# Patient Record
Sex: FEMALE | Race: WHITE | NOT HISPANIC OR LATINO | Employment: PART TIME | URBAN - METROPOLITAN AREA
[De-identification: names, ages, dates, MRNs, and addresses within clinical notes are randomized per-mention and may not be internally consistent; named-entity substitution may affect disease eponyms.]

---

## 2017-01-03 RX ORDER — ESCITALOPRAM OXALATE 10 MG/1
TABLET ORAL
Qty: 90 TABLET | Refills: 1 | Status: SHIPPED | OUTPATIENT
Start: 2017-01-03 | End: 2019-07-30

## 2017-01-05 RX ORDER — ESCITALOPRAM OXALATE 10 MG/1
TABLET ORAL
Qty: 30 TABLET | Refills: 5 | Status: SHIPPED | OUTPATIENT
Start: 2017-01-05 | End: 2017-07-13

## 2017-06-26 ENCOUNTER — PATIENT OUTREACH (OUTPATIENT)
Dept: ADMINISTRATIVE | Facility: HOSPITAL | Age: 47
End: 2017-06-26

## 2017-06-26 NOTE — LETTER
June 26, 2017    Adamaris Vanegas  [address] [facility] [address]             [facility]  [address]  [facility] [address]  Phone: [phone] Dear Mrs. Vanegas:    [facility] is committed to your overall health.  To help you get the most out of each of your visits, we will review your information to make sure you are up to date on all of your recommended tests and/or procedures.      Dr. Karissa Allen has found that you may be due for a tetanus immunization.     If you have had any of the above done at another facility, please bring the records or information with you so that your record at [facility] will be complete.  If you would like to schedule any of these, please contact me.    If you are currently taking medication, please bring it with you to your appointment for review.    If you have any questions or concerns, please don't hesitate to call.    Thank you for letting us care for you,  Dagmar Gallegos LPN Clinical Care Coordinator  [facility]  [phone]

## 2017-07-13 ENCOUNTER — OFFICE VISIT (OUTPATIENT)
Dept: FAMILY MEDICINE | Facility: CLINIC | Age: 47
End: 2017-07-13
Payer: COMMERCIAL

## 2017-07-13 VITALS
HEART RATE: 72 BPM | WEIGHT: 139.88 LBS | HEIGHT: 67 IN | BODY MASS INDEX: 21.96 KG/M2 | SYSTOLIC BLOOD PRESSURE: 102 MMHG | TEMPERATURE: 98 F | DIASTOLIC BLOOD PRESSURE: 60 MMHG

## 2017-07-13 DIAGNOSIS — F41.9 ANXIETY: Primary | ICD-10-CM

## 2017-07-13 DIAGNOSIS — Z23 IMMUNIZATION DUE: ICD-10-CM

## 2017-07-13 PROCEDURE — 90471 IMMUNIZATION ADMIN: CPT | Mod: S$GLB,,, | Performed by: FAMILY MEDICINE

## 2017-07-13 PROCEDURE — 90715 TDAP VACCINE 7 YRS/> IM: CPT | Mod: S$GLB,,, | Performed by: FAMILY MEDICINE

## 2017-07-13 PROCEDURE — 99213 OFFICE O/P EST LOW 20 MIN: CPT | Mod: S$GLB,,, | Performed by: FAMILY MEDICINE

## 2017-07-13 PROCEDURE — 99999 PR PBB SHADOW E&M-EST. PATIENT-LVL III: CPT | Mod: PBBFAC,,, | Performed by: FAMILY MEDICINE

## 2017-07-13 NOTE — PROGRESS NOTES
Subjective:       Patient ID: Adamaris Vanegas is a 47 y.o. female.    Chief Complaint: Follow-up    HPI   Patient here today for f/u.  Doing well on lexapro, no complaints with use. Sleep preserved. Has not required xanax.  Travelling to [place] next week for vacation. No vaccination records. Needs tdap at minimum. Not sure of exact location of travel, but discussed cdc recs which incl hep A, hep B, and malaria prophy. Also reviewed zika precautions.    Review of Systems   Constitutional: Negative for appetite change, fatigue and unexpected weight change.   Gastrointestinal: Negative for constipation, diarrhea and nausea.   Neurological: Negative for dizziness and headaches.   Psychiatric/Behavioral: Negative for dysphoric mood and sleep disturbance. The patient is not nervous/anxious.        Objective:      Physical Exam   Constitutional: She is oriented to person, place, and time. She appears well-developed and well-nourished. No distress.   HENT:   Head: Normocephalic and atraumatic.   Eyes: Conjunctivae are normal. Right eye exhibits no discharge. Left eye exhibits no discharge. No scleral icterus.   Neck: Normal range of motion. Neck supple.   Cardiovascular: Normal rate and regular rhythm.    Pulmonary/Chest: Effort normal and breath sounds normal. No respiratory distress.   Abdominal: Soft. She exhibits no distension.   Musculoskeletal: Normal range of motion. She exhibits no edema.   Neurological: She is alert and oriented to person, place, and time.   Skin: Skin is warm and dry. No rash noted.   Psychiatric: She has a normal mood and affect. Her behavior is normal.   Nursing note and vitals reviewed.        Anxiety  Doing well on lexapro 10mg daily. Continue at current dose and sx monitor during school year. Let me know if sx recurrence.  Immunization due    Other orders  -     (In Office Administered) Tdap Vaccine

## 2017-09-12 RX ORDER — ACYCLOVIR 800 MG/1
TABLET ORAL
Qty: 15 TABLET | Refills: 0 | Status: SHIPPED | OUTPATIENT
Start: 2017-09-12 | End: 2019-07-28 | Stop reason: SDUPTHER

## 2017-12-21 ENCOUNTER — OFFICE VISIT (OUTPATIENT)
Dept: FAMILY MEDICINE | Facility: CLINIC | Age: 47
End: 2017-12-21
Payer: COMMERCIAL

## 2017-12-21 VITALS
OXYGEN SATURATION: 98 % | BODY MASS INDEX: 22.79 KG/M2 | SYSTOLIC BLOOD PRESSURE: 102 MMHG | HEART RATE: 90 BPM | WEIGHT: 145.19 LBS | HEIGHT: 67 IN | DIASTOLIC BLOOD PRESSURE: 66 MMHG

## 2017-12-21 DIAGNOSIS — R68.89 FLU-LIKE SYMPTOMS: Primary | ICD-10-CM

## 2017-12-21 LAB
CTP QC/QA: YES
FLUAV AG NPH QL: NEGATIVE
FLUBV AG NPH QL: NEGATIVE

## 2017-12-21 PROCEDURE — 87804 INFLUENZA ASSAY W/OPTIC: CPT | Mod: QW,S$GLB,, | Performed by: FAMILY MEDICINE

## 2017-12-21 PROCEDURE — 99213 OFFICE O/P EST LOW 20 MIN: CPT | Mod: S$GLB,,, | Performed by: FAMILY MEDICINE

## 2017-12-21 PROCEDURE — 99999 PR PBB SHADOW E&M-EST. PATIENT-LVL III: CPT | Mod: PBBFAC,,, | Performed by: FAMILY MEDICINE

## 2017-12-21 NOTE — PROGRESS NOTES
Subjective:     THIS DOCUMENT WAS MADE IN PART WITH [employer] DICTATION SOFTWARE.  OCCASIONALLY THIS SOFTWARE WILL MISINTERPRET WORDS OR PHRASES.     Patient ID: Adamaris Vanegas is a 47 y.o. female.    Chief Complaint: Chills (pt states she has been having low grade fever since last night and painful coughing since Sunday.  )    HPI     Cough and congestion, temperature 101.0 this morning.  Symptoms present less than 24 hours.Temperature this morning was 101.0. Mucinex has helped in the past. She also did take ibuprofen for the fever.    Father susceptible to illness, getting out of the hospital, going into nursing home, has COPD, concerns that she may have a contagious illness.  She does not have any chronic lung disease.  No asthma no emphysema, nonsmoker.  She is a schoolteacher.     She's having some pain in the right upper chest. She thinks she may have pulled a muscle, the other day she was horsing around with her son though didn't feel any pain at the time. Just pain when she moves her arm. She does not have pain with respiration. Though she does have pain when she coughs    Past medical/family/social/medications/allergies reviewed and marked accordingly in this patient's electronic medical record, refer to the appropriate section for complete details.      Review of Systems   Constitutional: Positive for chills and fever.   HENT: Positive for congestion.    Respiratory: Positive for cough. Negative for shortness of breath and wheezing.    Cardiovascular:        As above       Objective:      Physical Exam   Constitutional: She is oriented to person, place, and time. She appears well-developed and well-nourished.   HENT:   Head: Normocephalic and atraumatic. Head is without right periorbital erythema and without left periorbital erythema.   Right Ear: Tympanic membrane, external ear and ear canal normal. No drainage.   Left Ear: Tympanic membrane, external ear and ear canal normal. No drainage.   Nose: No mucosal  edema (Boggy swollen turbinates) or rhinorrhea (Clear).   Mouth/Throat: Uvula is midline and oropharynx is clear and moist. No oropharyngeal exudate or posterior oropharyngeal erythema.   Eyes: Conjunctivae and EOM are normal. Pupils are equal, round, and reactive to light. Right eye exhibits no discharge. Left eye exhibits no discharge. No scleral icterus.   Neck: Normal range of motion. Neck supple. No tracheal deviation present. No thyromegaly present.   Cardiovascular: Normal rate, regular rhythm and normal heart sounds.  Exam reveals no gallop and no friction rub.    No murmur heard.  Pulmonary/Chest: Effort normal and breath sounds normal. No stridor. No respiratory distress. She has no wheezes. She has no rales.   Lungs are clear. No distress. Lung sounds present throughout all lung fields. No crackles.   Lymphadenopathy:     She has no cervical adenopathy.   Neurological: She is alert and oriented to person, place, and time. She has normal reflexes. No cranial nerve deficit.   Skin: Skin is warm and dry. She is not diaphoretic.   Psychiatric: She has a normal mood and affect. Her behavior is normal.   Vitals reviewed.      Assessment:       1. Flu-like symptoms        Plan:       Adamaris was seen today for chills.    Diagnoses and all orders for this visit:    Flu-like symptoms  -     POCT Influenza A/B     rapid flu is negative. Suspected viral respiratory infection. Use Mucinex, ibuprofen. She should assume that she is contagious and use precautions especially if around her father who is susceptible to illness.  But she will monitor symptoms, a fever persists or worsens, Z. If she develops copious colored sputum and reevaluate. Also the discomfort in her right upper chest appears musculoskeletal did not hear anything on exam to suggest pneumonia, pneumothorax, etc. But we do not have radiology services available after 4 PM so I could not check in x-ray but again my suspicion is low. But she was advised if  symptoms worsen to contact us tomorrow for reconsideration for x-ray.

## 2018-01-08 RX ORDER — ESCITALOPRAM OXALATE 10 MG/1
TABLET ORAL
Qty: 90 TABLET | Refills: 1 | Status: SHIPPED | OUTPATIENT
Start: 2018-01-08 | End: 2018-07-25 | Stop reason: SDUPTHER

## 2018-07-26 RX ORDER — ESCITALOPRAM OXALATE 10 MG/1
TABLET ORAL
Qty: 90 TABLET | Refills: 1 | Status: SHIPPED | OUTPATIENT
Start: 2018-07-26 | End: 2019-01-26 | Stop reason: SDUPTHER

## 2018-10-12 RX ORDER — DOXYCYCLINE 100 MG/1
100 CAPSULE ORAL 2 TIMES DAILY
Qty: 20 CAPSULE | Refills: 0 | Status: SHIPPED | OUTPATIENT
Start: 2018-10-12 | End: 2018-10-22

## 2018-10-12 RX ORDER — BENZONATATE 200 MG/1
200 CAPSULE ORAL 3 TIMES DAILY PRN
Qty: 30 CAPSULE | Refills: 1 | Status: SHIPPED | OUTPATIENT
Start: 2018-10-12 | End: 2018-10-22

## 2018-11-13 DIAGNOSIS — M25.521 RIGHT ELBOW PAIN: Primary | ICD-10-CM

## 2018-11-13 DIAGNOSIS — M77.11 LATERAL EPICONDYLITIS, RIGHT ELBOW: ICD-10-CM

## 2018-11-19 ENCOUNTER — CLINICAL SUPPORT (OUTPATIENT)
Dept: REHABILITATION | Facility: HOSPITAL | Age: 48
End: 2018-11-19
Attending: ORTHOPAEDIC SURGERY
Payer: COMMERCIAL

## 2018-11-19 DIAGNOSIS — R68.89 DECREASED FUNCTIONAL ACTIVITY TOLERANCE: ICD-10-CM

## 2018-11-19 DIAGNOSIS — M25.521 RIGHT ELBOW PAIN: Primary | ICD-10-CM

## 2018-11-19 DIAGNOSIS — R29.898 DECREASED GRIP STRENGTH OF RIGHT HAND: ICD-10-CM

## 2018-11-19 PROCEDURE — 97165 OT EVAL LOW COMPLEX 30 MIN: CPT | Mod: PN

## 2018-11-19 PROCEDURE — 97035 APP MDLTY 1+ULTRASOUND EA 15: CPT | Mod: 59,PN

## 2018-11-19 PROCEDURE — G8984 CARRY CURRENT STATUS: HCPCS | Mod: CK,PN

## 2018-11-19 PROCEDURE — G8985 CARRY GOAL STATUS: HCPCS | Mod: CJ,PN

## 2018-11-19 NOTE — PROGRESS NOTES
Occupational Therapy Evaluation    Patient:  Adamaris Vanegas  Date of Therapy Visit:  2018  Referring Physician: Scot  Diagnosis: Right Lateral Epicondylitis   MRN : [id number]  Referral Orders:  Eval and treat     Start Time: 800am  End Time: 900am  Total Time:  60 mins    Certification period to 18  Visit # 1 of 20      HISTORY/OCCUPATIONAL PROFILE/ Medical and Therapy History:  Subjective:  Patient is a 48 year old right hand dominant female who presents for occupational therapy today,2018 with a diagnosis of right lateral epicondylitis.  She states that in 2018 she started having discomfort while lifting things. 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 In September, she had a cortisone injection which helped for several weeks but now the pain has returned.  Patient's chief complaint is pain.  She is noticing she is starting to avoid activities due to pain such as carrying a gallon of milk and flat ironing hair.     Date of onset:  2018  Location of injury:   Right  Rehabilitation Expectation/Goals: Patient's goals for therapy are to be able to - minimize: pain  - normalize: loss of function   Pain:   During no work/At Rest:    1 out of 10   While working/ With Activity:  9 out of 10   Sleepin out of 10    Location of Pain:  Lateral epicondylitis   Description of Pain: chronic sharp and dull   Pain relived by: rest    Previous Medical Management/ Past Medical History/Physical Systems Review:   Patient denies any previous injury to this area.  No past medical history on file.  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, lifting teacher bag   Workmen's Compensation:no        FUNCTIONAL STATUS:   Previous  functional status includes: Independent with all ADLs and ambulating without assistance   Current FunctionalStatus: independent with all ADLs with compensation   Home/Living environment :  Lives with  and 13 and 16 years old and dog   Limitation of Functional Status:   ADLs (difficulty with the following tasks):    - Feeding: i    - Meal Prep: lifting coffee pot, carry/lift a pot of food    - Bathing: i    - Dressing: i    - Grooming: flat ironing hair, brushing hair      Self Care / ADL:     IADLs: (difficulty with the following tasks):    - managing finances/ writing: i    - driving/handling transportation: i    - shopping: lifting groceries, grasping items from shelf    - using phone: i    - computer: using mouse    - managing medications: i    - housework & basic home maintenance: sweeping    Leisure:     Environmental Concerns/ Fall Risk:  None   Barriers to Learning:  None   Cultural/Spiritual : None   Developmental/Education: None  Nutritional Deficit: None   Abuse/Neglect : None    Language: None   Hearing/Vision Deficit: None   Other:     Objective:  Edema/ Girth/Volume: Pre-Treatment Girth (cm):     Date: 11/19/18 11/19/18    Right Left   Elbow 25.1 25.2     Observations:    Sensation Test:  Sensation grossly intact to light touch all dermatomal regions  Stereognosis:  Intact  [place]-Britta Testing:  n/a  Sensitivity: Patient denies numbness & tingling; Temp, touch and pressure sense are intact    Palpation:  Skin Condition/Scarring/ Integument Scars/ Characteristics:     Edema: none  Special Tests:   Special Testing:  Cozen's Test: + right  Phalen's Test: -  Durkan's Test: -  Tinel's @ cubital tunnel: -  Tinel's @ carpal tunnel: -        TEST Left Right   Elbow 11/19/18 11/19/18   Tinel's Sign Negative  Negative    Resisted Tennis Elbow Negative  Positive   Resisted Golfer's Elbow Negative  Negative        Range of Motion: AROM  Manual Muscle Test:  Brachioradialis: 5/5  Brachialis: 5/5  Biceps:  5/5  Triceps: 5/5  Wrist Extension: 5/5  Wrist Flexion: 5/5  Supinator: 5/5  Pronator Teres: 5/5      Coordination:  not impaired for FMC in right  in ADL/IADL's     Endurance: moderatelyimpaired for light ADL/IADL's w/ use of right           Strength: (SANTIAGO Dynamometer in lbs.), Average 3 trials, Position II             RIGHT           LEFT      Date:   11/19/18 11/19/18      Gross  II Elbow flex 52,49,50# 60,61,61#  GG right is 81% of left     Gross  II Elbow ext 20,20,20# 61,62,62#  GG right elbow extended is 32% of left     Treatment/ Patient and family/caregiver learning and education:     OT eval performed today and instruction in written HEP including  adaptation education, forearm stretches, modalities  Patient did require any verbal and physical cues to perform exercises correctly.  MODALITIES:  -CUS continuous ultrasound 1mhz x 8' @ 1.1w/cm2  to Right Lateral epicondylitis to enhance the mobility of the underlying soft tissue and to decrease pain  -IASTM forearm extensors  THERAPEUTIC EXERCISES x 15 mins: to increase ROM, increase strength and increase functional use  -bilateral passive forearm stretches    Assessment:   Profile and History Assessment of Occupational Performance Level of Clinical Decision Making Complexity Score   Occupational Profile:   Adamaris Vanegas is a 48 y.o. female who lives with their family and is currently employed as . Adamaris Vanegas has difficulty with  grooming and dressing  shopping and housework/household chores  affecting his/her daily functional abilities. His/her main goal for therapy is to decrease her pain.     Comorbidities: none    Medical and Therapy History Review:   No past medical history on file.         Performance Deficits    Physical:  Muscle Power/Strength   Strength  Pain    Cognitive:  No Deficits    Psychosocial:    No Deficits     Clinical Decision Making:  low    Assessment Process:  Problem-Focused  Assessments    Modification/Need for Assistance:  Not Necessary    Intervention Selection:  Limited Treatment Options       low  Based on PMHX, co morbidities , data from assessments and functional level of assistance required with task and clinical presentation directly impacting function.       Medical necessity is demonstrated by the following IMPAIRMENTS/PROBLEMS:  Patient Lack of Knowledge/Awareness in Home Program  Increased Pain in right  Decreased functional use/ unable to perform ADLs/iADLs  Decreased  strength    Adamaris presents to occupational therapy with an OT diagnosis of right lateral epicondylitis and presents with the above listed problem areas.  We reviewed a detailed home program to address these areas and patient was able to independently demonstrate these while in therapy today.  The patient requires skilled occupational therapy to address the problems identified above and to achieve the individual patient goals as outlined in the problems and goals section.  Overall rehab potential is GOOD.  The patient and or family/caregiver was educated regarding the details of their diagnosis, prognosis, related pathology, plan of care and modality use.  The patient demonstrates a GOOD understanding of the risks, benefits, precautions/ contraindications and prognosis of their skilled occupational therapy rehabilitation program.  We reviewed therapy expectations and goals and it was understood clearly that they will be active participants in their rehabilitation.    Adamaris demonstrated a good understanding of the education provided including HEP and modalities for pain management.     Patient prefers to attend therapy: 1 times a week with time preference of afternoons    G CODE TOOL: FOTO    Category: self care/ carrying      Current Score  = 50% impaired  Goal at Discharge Score = 35% impaired    Score interpretation is as follows:     TEST SCORE  Modifier  Impairment Limitation Restriction    0%  CH  0 %  impaired, limited or restricted    1-19%  CI  @ least 1% but less than 20% impaired, limited or restricted    20-39%  CJ  @ least 20%<40% impaired, limited or restricted    40-59%  CK  @ least 40%<60% impaired, limited or restricted    60-79%  CL  @ least 60% <80% impaired, limited or restricted    80-99%  CM  @ least 80%<100% impaired limited or restricted    100%  CN  100% impaired, limited or restricted     GOALS:  Short Therm Goals: (2 weeks)    STG: Patient will be independent in home exercise and self care program    STG : Symptomatic Improvements: Decreasing Pain: to 5/10 for increased activity tolerance    STG: Patient to be IND with HEP and modalities for pain management   Long Term Goals: (8-10 weeks)   LTG: Decrease complaints of pain to 0 out of 10 to increase tolerance for ADLs    LTG: Increase independence in the following ADLs/iADLs: self care to brush hair with right   LTG: Increase functional use of  Right to increase independence with using a flat iron for hair   LTG: Increase  strength of right by 5#  to grasp/hold pot and perform entire cooking task   LTG:  Patient will show overall improved functional ability with upper extremity by achieveing a FOTO (Focus on Therappeutic Outcomes) score of less than or equal to 35%    Pt's spiritual, cultural and educational needs considered and patient is agreeable to plan of care and goals as stated above.     There are no Anticipated Barriers for Occupational  therapy      Plan:   Patient to be treated by Occupational Therapy 2 times per week for 8 weeks  to achieve the established goals. Treatment to include modalities including but not limited to paraffin, fluidotherapy, moist heat pack, edema control techniques, A/PROM, Manual therapy/mobilizations, Therapeutic exercises/activities, ultrasound, scar maturation techniques, desensitization, strengthening, neural mobilizations/nerve gliding, stretching as well as any other treatments deemed necessary  based on the patient's needs or progress.                     I certify the need for these services furnished under this plan of treatment and while under my care    ____________________________________                        ______________  Physician/Referring Practitioner                   Date of Signature

## 2018-11-19 NOTE — PLAN OF CARE
Occupational Therapy Evaluation    Patient:  Adamaris Vanegas  Date of Therapy Visit:  2018  Referring Physician: Scot  Diagnosis: Right Lateral Epicondylitis   MRN : [id number]  Referral Orders:  Eval and treat     Start Time: 800am  End Time: 900am  Total Time:  60 mins    Certification period to 18  Visit # 1 of 20      HISTORY/OCCUPATIONAL PROFILE/ Medical and Therapy History:  Subjective:  Patient is a 48 year old right hand dominant female who presents for occupational therapy today,2018 with a diagnosis of right lateral epicondylitis.  She states that in 2018 she started having discomfort while lifting things. 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 In September, she had a cortisone injection which helped for several weeks but now the pain has returned.  Patient's chief complaint is pain.  She is noticing she is starting to avoid activities due to pain such as carrying a gallon of milk and flat ironing hair.     Date of onset:  2018  Location of injury:   Right  Rehabilitation Expectation/Goals: Patient's goals for therapy are to be able to - minimize: pain  - normalize: loss of function   Pain:   During no work/At Rest:    1 out of 10   While working/ With Activity:  9 out of 10   Sleepin out of 10    Location of Pain:  Lateral epicondylitis   Description of Pain: chronic sharp and dull   Pain relived by: rest    Previous Medical Management/ Past Medical History/Physical Systems Review:   Patient denies any previous injury to this area.  No past medical history on file.  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, lifting teacher bag   Workmen's Compensation:no        FUNCTIONAL STATUS:   Previous  functional status includes: Independent with all ADLs and ambulating without assistance   Current FunctionalStatus: independent with all ADLs with compensation   Home/Living environment :  Lives with  and 13 and 16 years old and dog   Limitation of Functional Status:   ADLs (difficulty with the following tasks):    - Feeding: i    - Meal Prep: lifting coffee pot, carry/lift a pot of food    - Bathing: i    - Dressing: i    - Grooming: flat ironing hair, brushing hair      Self Care / ADL:     IADLs: (difficulty with the following tasks):    - managing finances/ writing: i    - driving/handling transportation: i    - shopping: lifting groceries, grasping items from shelf    - using phone: i    - computer: using mouse    - managing medications: i    - housework & basic home maintenance: sweeping    Leisure:     Environmental Concerns/ Fall Risk:  None   Barriers to Learning:  None   Cultural/Spiritual : None   Developmental/Education: None  Nutritional Deficit: None   Abuse/Neglect : None    Language: None   Hearing/Vision Deficit: None   Other:     Objective:  Edema/ Girth/Volume: Pre-Treatment Girth (cm):     Date: 11/19/18 11/19/18    Right Left   Elbow 25.1 25.2     Observations:    Sensation Test:  Sensation grossly intact to light touch all dermatomal regions  Stereognosis:  Intact  Westover-Britta Testing:  n/a  Sensitivity: Patient denies numbness & tingling; Temp, touch and pressure sense are intact    Palpation:  Skin Condition/Scarring/ Integument Scars/ Characteristics:     Edema: none  Special Tests:   Special Testing:  Cozen's Test: + right  Phalen's Test: -  Durkan's Test: -  Tinel's @ cubital tunnel: -  Tinel's @ carpal tunnel: -        TEST Left Right   Elbow 11/19/18 11/19/18   Tinel's Sign Negative  Negative    Resisted Tennis Elbow Negative  Positive   Resisted Golfer's Elbow Negative  Negative        Range of Motion: AROM  Manual Muscle Test:  Brachioradialis: 5/5  Brachialis: 5/5  Biceps:  5/5  Triceps: 5/5  Wrist Extension: 5/5  Wrist Flexion: 5/5  Supinator: 5/5  Pronator Teres: 5/5      Coordination:  not impaired for FMC in right  in ADL/IADL's     Endurance: moderatelyimpaired for light ADL/IADL's w/ use of right           Strength: (SANTIAGO Dynamometer in lbs.), Average 3 trials, Position II             RIGHT           LEFT      Date:   11/19/18 11/19/18      Gross  II Elbow flex 52,49,50# 60,61,61#  GG right is 81% of left     Gross  II Elbow ext 20,20,20# 61,62,62#  GG right elbow extended is 32% of left     Treatment/ Patient and family/caregiver learning and education:     OT eval performed today and instruction in written HEP including  adaptation education, forearm stretches, modalities  Patient did require any verbal and physical cues to perform exercises correctly.  MODALITIES:  -CUS continuous ultrasound 1mhz x 8' @ 1.1w/cm2  to Right Lateral epicondylitis to enhance the mobility of the underlying soft tissue and to decrease pain  -IASTM forearm extensors  THERAPEUTIC EXERCISES x 15 mins: to increase ROM, increase strength and increase functional use  -bilateral passive forearm stretches    Assessment:   Profile and History Assessment of Occupational Performance Level of Clinical Decision Making Complexity Score   Occupational Profile:   Adamaris Vanegas is a 48 y.o. female who lives with their family and is currently employed as . Adamaris Vanegas has difficulty with  grooming and dressing  shopping and housework/household chores  affecting his/her daily functional abilities. His/her main goal for therapy is to decrease her pain.     Comorbidities: none    Medical and Therapy History Review:   No past medical history on file.         Performance Deficits    Physical:  Muscle Power/Strength   Strength  Pain    Cognitive:  No Deficits    Psychosocial:    No Deficits     Clinical Decision Making:  low    Assessment Process:  Problem-Focused  Assessments    Modification/Need for Assistance:  Not Necessary    Intervention Selection:  Limited Treatment Options       low  Based on PMHX, co morbidities , data from assessments and functional level of assistance required with task and clinical presentation directly impacting function.       Medical necessity is demonstrated by the following IMPAIRMENTS/PROBLEMS:  Patient Lack of Knowledge/Awareness in Home Program  Increased Pain in right  Decreased functional use/ unable to perform ADLs/iADLs  Decreased  strength    Adamaris presents to occupational therapy with an OT diagnosis of right lateral epicondylitis and presents with the above listed problem areas.  We reviewed a detailed home program to address these areas and patient was able to independently demonstrate these while in therapy today.  The patient requires skilled occupational therapy to address the problems identified above and to achieve the individual patient goals as outlined in the problems and goals section.  Overall rehab potential is GOOD.  The patient and or family/caregiver was educated regarding the details of their diagnosis, prognosis, related pathology, plan of care and modality use.  The patient demonstrates a GOOD understanding of the risks, benefits, precautions/ contraindications and prognosis of their skilled occupational therapy rehabilitation program.  We reviewed therapy expectations and goals and it was understood clearly that they will be active participants in their rehabilitation.    Adamaris demonstrated a good understanding of the education provided including HEP and modalities for pain management.     Patient prefers to attend therapy: 1 times a week with time preference of afternoons    G CODE TOOL: FOTO    Category: self care/ carrying      Current Score  = 50% impaired  Goal at Discharge Score = 35% impaired    Score interpretation is as follows:     TEST SCORE  Modifier  Impairment Limitation Restriction    0%  CH  0 %  impaired, limited or restricted    1-19%  CI  @ least 1% but less than 20% impaired, limited or restricted    20-39%  CJ  @ least 20%<40% impaired, limited or restricted    40-59%  CK  @ least 40%<60% impaired, limited or restricted    60-79%  CL  @ least 60% <80% impaired, limited or restricted    80-99%  CM  @ least 80%<100% impaired limited or restricted    100%  CN  100% impaired, limited or restricted     GOALS:  Short Therm Goals: (2 weeks)    STG: Patient will be independent in home exercise and self care program    STG : Symptomatic Improvements: Decreasing Pain: to 5/10 for increased activity tolerance    STG: Patient to be IND with HEP and modalities for pain management   Long Term Goals: (8-10 weeks)   LTG: Decrease complaints of pain to 0 out of 10 to increase tolerance for ADLs    LTG: Increase independence in the following ADLs/iADLs: self care to brush hair with right   LTG: Increase functional use of  Right to increase independence with using a flat iron for hair   LTG: Increase  strength of right by 5#  to grasp/hold pot and perform entire cooking task   LTG:  Patient will show overall improved functional ability with upper extremity by achieveing a FOTO (Focus on Therappeutic Outcomes) score of less than or equal to 35%    Pt's spiritual, cultural and educational needs considered and patient is agreeable to plan of care and goals as stated above.     There are no Anticipated Barriers for Occupational  therapy      Plan:   Patient to be treated by Occupational Therapy 2 times per week for 8 weeks  to achieve the established goals. Treatment to include modalities including but not limited to paraffin, fluidotherapy, moist heat pack, edema control techniques, A/PROM, Manual therapy/mobilizations, Therapeutic exercises/activities, ultrasound, scar maturation techniques, desensitization, strengthening, neural mobilizations/nerve gliding, stretching as well as any other treatments deemed necessary  based on the patient's needs or progress.                     I certify the need for these services furnished under this plan of treatment and while under my care    ____________________________________                        ______________  Physician/Referring Practitioner                   Date of Signature

## 2018-11-26 ENCOUNTER — CLINICAL SUPPORT (OUTPATIENT)
Dept: REHABILITATION | Facility: HOSPITAL | Age: 48
End: 2018-11-26
Attending: ORTHOPAEDIC SURGERY
Payer: COMMERCIAL

## 2018-11-26 DIAGNOSIS — R68.89 DECREASED FUNCTIONAL ACTIVITY TOLERANCE: ICD-10-CM

## 2018-11-26 DIAGNOSIS — R29.898 DECREASED GRIP STRENGTH OF RIGHT HAND: ICD-10-CM

## 2018-11-26 DIAGNOSIS — M25.521 RIGHT ELBOW PAIN: Primary | ICD-10-CM

## 2018-11-26 PROCEDURE — 97140 MANUAL THERAPY 1/> REGIONS: CPT | Mod: 59,PN

## 2018-11-26 PROCEDURE — 97110 THERAPEUTIC EXERCISES: CPT | Mod: PN

## 2018-11-26 PROCEDURE — 97035 APP MDLTY 1+ULTRASOUND EA 15: CPT | Mod: PN

## 2018-11-26 NOTE — PROGRESS NOTES
"Occupational Therapy Daily Notes    Patient:  Adamaris Vanegas  Date of Therapy Visit:  11/26/2018  Referring Physician: Scot  Diagnosis: Right Lateral Epicondylitis   MRN : [id number]  Referral Orders:  Eval and treat     Start Time: 400pm  End Time: 515pm  Total Time:  75 mins    Certification period to 12/31/18  Visit # 2 of 20  G CODE TOOL: FOTO  Category: self care/ carrying  Current Score 11/19/18  = 50% impaired  Goal at Discharge Score = 35% impaired      HISTORY/OCCUPATIONAL PROFILE/ Medical and Therapy History:  Subjective:  Patient is a 48 year old right hand dominant female who presents for occupational therapy today,11/26/2018 with a diagnosis of right lateral epicondylitis.  She states that in July of 2018 she started having discomfort while lifting things. 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 In September, she had a cortisone injection which helped for several weeks but now the pain has returned.  Patient's chief complaint is pain.  She is noticing she is starting to avoid activities due to pain such as carrying a gallon of milk and flat ironing hair.     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, lifting teacher bag   Workmen's Compensation:no        FUNCTIONAL STATUS:   Previous functional status includes: Independent with all ADLs and ambulating without assistance   Current FunctionalStatus: independent with all ADLs with compensation   Home/Living environment :  Lives with "  and 13 and 16 years old and dog   Limitation of Functional Status:   ADLs (difficulty with the following tasks):    - Meal Prep: lifting coffee pot, carry/lift a pot of food    - Grooming: flat ironing hair, brushing hair    - shopping: lifting groceries, grasping items from shelf    - computer: using mouse    - housework & basic home maintenance: sweeping      Objective:  Edema/ Girth/Volume: Pre-Treatment Girth (cm):     Date: 11/19/18 11/19/18    Right Left   Elbow 25.1 25.2     Observations:        Strength: (SANTIAGO Dynamometer in lbs.), Average 3 trials, Position II             RIGHT           LEFT      Date:   11/19/18 11/19/18      Gross  II Elbow flex 52,49,50# 60,61,61#  GG right is 81% of left     Gross  II Elbow ext 20,20,20# 61,62,62#  GG right elbow extended is 32% of left     Treatment:  MODALITIES:  -MH x 2 elbow and  hand  -CUS continuous ultrasound 1mhz x 8' @ 1.1w/cm2  to Right Lateral epicondylitis to enhance the mobility of the underlying soft tissue and to decrease pain  MANUAL THERAPY X 40 mins: to decrease pain, increase ROM and functional use  -IASTM forearm extensors x 10  -STM to forearm extensors x 5  -DFM to ECRB insertion x 5  --ESTIM premod, sweep on,  HZ; cycle time 5/5, CV/CV x 9.6Volts CV x 20 mins to dorsal forearm extensors to decrease inflammation and decrease pain  THERAPEUTIC EXERCISES x 15 mins: to increase ROM, increase strength and increase functional use  -bilateral passive forearm stretches    Assessment:   Medical necessity is demonstrated by the following IMPAIRMENTS/PROBLEMS:  Patient Lack of Knowledge/Awareness in Home Program  Increased Pain in right  Decreased functional use/ unable to perform ADLs/iADLs  Decreased  strength    Adamaris presents to occupational therapy with an OT diagnosis of right lateral epicondylitis and presents with the above listed problem areas. Adamaris arrives to her first visit of therapy today stating she does not  feel like the home program is helping her because she still feels pain in her elbow.  We reviewed diagnosis of chronic lateral epicondylitis thoroughly with the fact that she has had these symptoms for almost 6 months.  She was encouraged to continue with her home program in order to help decrease her symptoms.  She appeared to understand but said she is frustrated.  She tolerated all exercises and modalities well today.  She did not have significant tenderness with IASTM on forearm extensors but does have hypersensitivity to ECRB insertion.    The patient requires skilled occupational therapy to address the problems identified above and to achieve the individual patient goals as outlined in the problems and goals section.      GOALS:  Short Therm Goals: (2 weeks)    STG: Patient will be independent in home exercise and self care program    STG : Symptomatic Improvements: Decreasing Pain: to 5/10 for increased activity tolerance    STG: Patient to be IND with HEP and modalities for pain management   Long Term Goals: (8-10 weeks)   LTG: Decrease complaints of pain to 0 out of 10 to increase tolerance for ADLs    LTG: Increase independence in the following ADLs/iADLs: self care to brush hair with right   LTG: Increase functional use of  Right to increase independence with using a flat iron for hair   LTG: Increase  strength of right by 5#  to grasp/hold pot and perform entire cooking task   LTG:  Patient will show overall improved functional ability with upper extremity by achieveing a FOTO (Focus on Therappeutic Outcomes) score of less than or equal to 35%    Pt's spiritual, cultural and educational needs considered and patient is agreeable to plan of care and goals as stated above.     There are no Anticipated Barriers for Occupational  therapy      Plan:   Patient to be treated by Occupational Therapy 2 times per week for 8 weeks  to achieve the established goals. Treatment to include modalities including but  not limited to paraffin, fluidotherapy, moist heat pack, edema control techniques, A/PROM, Manual therapy/mobilizations, Therapeutic exercises/activities, ultrasound, scar maturation techniques, desensitization, strengthening, neural mobilizations/nerve gliding, stretching as well as any other treatments deemed necessary based on the patient's needs or progress.                     I certify the need for these services furnished under this plan of treatment and while under my care    ____________________________________                        ______________  Physician/Referring Practitioner                   Date of Signature

## 2018-12-03 ENCOUNTER — CLINICAL SUPPORT (OUTPATIENT)
Dept: REHABILITATION | Facility: HOSPITAL | Age: 48
End: 2018-12-03
Attending: ORTHOPAEDIC SURGERY
Payer: COMMERCIAL

## 2018-12-03 DIAGNOSIS — M25.521 RIGHT ELBOW PAIN: Primary | ICD-10-CM

## 2018-12-03 DIAGNOSIS — R68.89 DECREASED FUNCTIONAL ACTIVITY TOLERANCE: ICD-10-CM

## 2018-12-03 DIAGNOSIS — R29.898 DECREASED GRIP STRENGTH OF RIGHT HAND: ICD-10-CM

## 2018-12-03 PROCEDURE — 97140 MANUAL THERAPY 1/> REGIONS: CPT | Mod: 59,PN

## 2018-12-03 PROCEDURE — 97110 THERAPEUTIC EXERCISES: CPT | Mod: PN

## 2018-12-03 PROCEDURE — 97035 APP MDLTY 1+ULTRASOUND EA 15: CPT | Mod: PN

## 2018-12-03 NOTE — PROGRESS NOTES
"Occupational Therapy Daily Notes    Patient:  Adamaris Vanegas  Date of Therapy Visit:  12/3/2018  Referring Physician: Scot  Diagnosis: Right Lateral Epicondylitis   MRN : [id number]  Referral Orders:  Eval and treat     Start Time: 400pm  End Time: 515pm  Total Time:  75 mins    Certification period to 12/31/18  Visit # 3 of 20  G CODE TOOL: FOTO  Category: self care/ carrying  Current Score 11/19/18  = 50% impaired  Goal at Discharge Score = 35% impaired      HISTORY/OCCUPATIONAL PROFILE/ Medical and Therapy History:  Subjective:  Patient is a 48 year old right hand dominant female who presents for occupational therapy with a diagnosis of right lateral epicondylitis.  She states that in July of 2018 she started having discomfort while lifting things. 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 In September, she had a cortisone injection which helped for several weeks but now the pain has returned.  Patient's chief complaint is pain.  She is noticing she is starting to avoid activities due to pain such as carrying a gallon of milk and flat ironing hair.     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, lifting teacher bag   Workmen's Compensation:no        FUNCTIONAL STATUS:   Previous functional status includes: Independent with all ADLs and ambulating without assistance   Current FunctionalStatus: independent with all ADLs with compensation   Home/Living environment :  Lives with  and 13 and 16 " years old and dog   Limitation of Functional Status:   ADLs (difficulty with the following tasks):    - Meal Prep: lifting coffee pot, carry/lift a pot of food    - Grooming: flat ironing hair, brushing hair    - shopping: lifting groceries, grasping items from shelf    - computer: using mouse    - housework & basic home maintenance: sweeping      Objective:  Edema/ Girth/Volume: Pre-Treatment Girth (cm):     Date: 11/19/18 11/19/18    Right Left   Elbow 25.1 25.2     Observations:        Strength: (SANTIAGO Dynamometer in lbs.), Average 3 trials, Position II             RIGHT           LEFT      Date:   11/19/18 11/19/18      Gross  II Elbow flex 52,49,50# 60,61,61#  GG right is 81% of left     Gross  II Elbow ext 20,20,20# 61,62,62#  GG right elbow extended is 32% of left     Treatment:  MODALITIES:  -MH x 2 elbow and  hand  -CUS continuous ultrasound 1mhz x 8' @ 1.1w/cm2  to Right Lateral epicondylitis to enhance the mobility of the underlying soft tissue and to decrease pain  MANUAL THERAPY X 40 mins: to decrease pain, increase ROM and functional use  -IASTM forearm extensors x 10  -STM to forearm extensors x 5  -DFM to ECRB insertion x 5  --ESTIM premod, sweep on,  HZ; cycle time 5/5, CV/CV x 9.6Volts CV x 20 mins to dorsal forearm extensors to decrease inflammation and decrease pain  THERAPEUTIC EXERCISES x 15 mins: to increase ROM, increase strength and increase functional use  -bilateral passive forearm stretches  -activity modification review for work and kitchen activities    Assessment:   Medical necessity is demonstrated by the following IMPAIRMENTS/PROBLEMS:  Patient Lack of Knowledge/Awareness in Home Program  Increased Pain in right  Decreased functional use/ unable to perform ADLs/iADLs  Decreased  strength    Adamaris presents to occupational therapy with an OT diagnosis of right lateral epicondylitis and presents with the above listed problem areas. Adamaris states she had sharp pain  when reaching in her car.  She is having tenderness at the lateral epicondyle.  She has not been using ice appropriately and wrapping around her elbow.  We reviewed proper ways to use.  She thinks that the kinesiotape helped with her pain and was able to wear it for 3 days.  She is frustrated with slow progress.  We re-reviewed diagnosis of chronic lateral epicondylitis thoroughly with the fact that she has had these symptoms for almost 6 months.  She was encouraged to continue with her home program in order to help decrease her symptoms.  The patient requires skilled occupational therapy to address the problems identified above and to achieve the individual patient goals as outlined in the problems and goals section.      GOALS:  Short Therm Goals: (2 weeks)    STG: Patient will be independent in home exercise and self care program    STG : Symptomatic Improvements: Decreasing Pain: to 5/10 for increased activity tolerance    STG: Patient to be IND with HEP and modalities for pain management   Long Term Goals: (8-10 weeks)   LTG: Decrease complaints of pain to 0 out of 10 to increase tolerance for ADLs    LTG: Increase independence in the following ADLs/iADLs: self care to brush hair with right   LTG: Increase functional use of  Right to increase independence with using a flat iron for hair   LTG: Increase  strength of right by 5#  to grasp/hold pot and perform entire cooking task   LTG:  Patient will show overall improved functional ability with upper extremity by achieveing a FOTO (Focus on Therappeutic Outcomes) score of less than or equal to 35%    Pt's spiritual, cultural and educational needs considered and patient is agreeable to plan of care and goals as stated above.     There are no Anticipated Barriers for Occupational  therapy      Plan:   Patient to be treated by Occupational Therapy 2 times per week for 8 weeks  to achieve the established goals. Treatment to include modalities including but not  limited to paraffin, fluidotherapy, moist heat pack, edema control techniques, A/PROM, Manual therapy/mobilizations, Therapeutic exercises/activities, ultrasound, scar maturation techniques, desensitization, strengthening, neural mobilizations/nerve gliding, stretching as well as any other treatments deemed necessary based on the patient's needs or progress.                     I certify the need for these services furnished under this plan of treatment and while under my care    ____________________________________                        ______________  Physician/Referring Practitioner                   Date of Signature

## 2018-12-05 ENCOUNTER — CLINICAL SUPPORT (OUTPATIENT)
Dept: REHABILITATION | Facility: HOSPITAL | Age: 48
End: 2018-12-05
Attending: ORTHOPAEDIC SURGERY
Payer: COMMERCIAL

## 2018-12-05 DIAGNOSIS — M25.521 RIGHT ELBOW PAIN: Primary | ICD-10-CM

## 2018-12-05 DIAGNOSIS — R29.898 DECREASED GRIP STRENGTH OF RIGHT HAND: ICD-10-CM

## 2018-12-05 DIAGNOSIS — R68.89 DECREASED FUNCTIONAL ACTIVITY TOLERANCE: ICD-10-CM

## 2018-12-05 PROCEDURE — 97110 THERAPEUTIC EXERCISES: CPT | Mod: PN

## 2018-12-05 PROCEDURE — 97140 MANUAL THERAPY 1/> REGIONS: CPT | Mod: PN

## 2018-12-05 PROCEDURE — 97035 APP MDLTY 1+ULTRASOUND EA 15: CPT | Mod: PN

## 2018-12-06 NOTE — PROGRESS NOTES
"Occupational Therapy Daily Notes    Patient:  Adamaris Vanegas  Date of Therapy Visit:  12/5/2018  Referring Physician: Scot  Diagnosis: Right Lateral Epicondylitis   MRN : [id number]  Referral Orders:  Eval and treat     Start Time: 400pm  End Time: 515pm  Total Time:  75 mins    Certification period to 12/31/18  Visit # 4 of 20  G CODE TOOL: FOTO  Category: self care/ carrying  Current Score 11/19/18  = 50% impaired  Goal at Discharge Score = 35% impaired      HISTORY/OCCUPATIONAL PROFILE/ Medical and Therapy History:  Subjective:  Patient is a 48 year old right hand dominant female who presents for occupational therapy with a diagnosis of right lateral epicondylitis.  She states that in July of 2018 she started having discomfort while lifting things. 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 In September, she had a cortisone injection which helped for several weeks but now the pain has returned.  Patient's chief complaint is pain.  She is noticing she is starting to avoid activities due to pain such as carrying a gallon of milk and flat ironing hair.     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, lifting teacher bag   Workmen's Compensation:no        FUNCTIONAL STATUS:   Previous functional status includes: Independent with all ADLs and ambulating without assistance   Current FunctionalStatus: independent with all ADLs with compensation   Home/Living environment :  Lives with  and 13 and 16 " years old and dog   Limitation of Functional Status:   ADLs (difficulty with the following tasks):    - Meal Prep: lifting coffee pot, carry/lift a pot of food    - Grooming: flat ironing hair, brushing hair    - shopping: lifting groceries, grasping items from shelf    - computer: using mouse    - housework & basic home maintenance: sweeping      Objective:  Edema/ Girth/Volume: Pre-Treatment Girth (cm):     Date: 11/19/18 11/19/18    Right Left   Elbow 25.1 25.2     Observations:        Strength: (SANTIAGO Dynamometer in lbs.), Average 3 trials, Position II             RIGHT           LEFT      Date:   11/19/18 11/19/18      Gross  II Elbow flex 52,49,50# 60,61,61#  GG right is 81% of left     Gross  II Elbow ext 20,20,20# 61,62,62#  GG right elbow extended is 32% of left     Treatment:  MODALITIES:  -MH x 2 elbow and hand with paraffin Right hand  -CUS continuous ultrasound 1mhz x 8' @ 1.1w/cm2  to Right Lateral epicondylitis to enhance the mobility of the underlying soft tissue and to decrease pain  MANUAL THERAPY X 40 mins: to decrease pain, increase ROM and functional use  -IASTM forearm extensors x 10  -STM to forearm extensors x 5  -DFM to ECRB insertion x 5  --ESTIM premod, sweep on,  HZ; cycle time 5/5, CV/CV x 9.6Volts CV x 20 mins to dorsal forearm extensors to decrease inflammation and decrease pain  THERAPEUTIC EXERCISES x 15 mins: to increase ROM, increase strength and increase functional use  -bilateral passive forearm stretches  -activity modification review for work and kitchen activities    Assessment:   Medical necessity is demonstrated by the following IMPAIRMENTS/PROBLEMS:  Patient Lack of Knowledge/Awareness in Home Program  Increased Pain in right  Decreased functional use/ unable to perform ADLs/iADLs  Decreased  strength    Adamaris presents to occupational therapy with an OT diagnosis of right lateral epicondylitis and presents with the above listed problem areas. Adamaris  states she had significant pain relief after last session.  She said her kinesiotape came off after 2 days though.  We tried several trials of taping today (x4) and concluded that the body lotion she is using is keeping the adhesive from sticking.  After applying alcohol x 4, the last 2 strips did adhere.  She will eliminate this from her routing temporarily to help with tape attaching.  She is not compliant with cold pack yet but states she is buying a 2nd pack.  She was encouraged to continue with her home program in order to help decrease her symptoms.  The patient requires skilled occupational therapy to address the problems identified above and to achieve the individual patient goals as outlined in the problems and goals section.      GOALS:  Short Therm Goals: (2 weeks)    STG: Patient will be independent in home exercise and self care program    STG : Symptomatic Improvements: Decreasing Pain: to 5/10 for increased activity tolerance    STG: Patient to be IND with HEP and modalities for pain management   Long Term Goals: (8-10 weeks)   LTG: Decrease complaints of pain to 0 out of 10 to increase tolerance for ADLs    LTG: Increase independence in the following ADLs/iADLs: self care to brush hair with right   LTG: Increase functional use of  Right to increase independence with using a flat iron for hair   LTG: Increase  strength of right by 5#  to grasp/hold pot and perform entire cooking task   LTG:  Patient will show overall improved functional ability with upper extremity by achieveing a FOTO (Focus on Therappeutic Outcomes) score of less than or equal to 35%    Pt's spiritual, cultural and educational needs considered and patient is agreeable to plan of care and goals as stated above.     There are no Anticipated Barriers for Occupational  therapy      Plan:   Patient to be treated by Occupational Therapy 2 times per week for 8 weeks  to achieve the established goals. Treatment to include modalities  including but not limited to paraffin, fluidotherapy, moist heat pack, edema control techniques, A/PROM, Manual therapy/mobilizations, Therapeutic exercises/activities, ultrasound, scar maturation techniques, desensitization, strengthening, neural mobilizations/nerve gliding, stretching as well as any other treatments deemed necessary based on the patient's needs or progress.                     I certify the need for these services furnished under this plan of treatment and while under my care    ____________________________________                        ______________  Physician/Referring Practitioner                   Date of Signature

## 2018-12-10 ENCOUNTER — CLINICAL SUPPORT (OUTPATIENT)
Dept: REHABILITATION | Facility: HOSPITAL | Age: 48
End: 2018-12-10
Attending: ORTHOPAEDIC SURGERY
Payer: COMMERCIAL

## 2018-12-10 DIAGNOSIS — R68.89 DECREASED FUNCTIONAL ACTIVITY TOLERANCE: ICD-10-CM

## 2018-12-10 DIAGNOSIS — M25.521 RIGHT ELBOW PAIN: Primary | ICD-10-CM

## 2018-12-10 DIAGNOSIS — R29.898 DECREASED GRIP STRENGTH OF RIGHT HAND: ICD-10-CM

## 2018-12-10 PROCEDURE — 97110 THERAPEUTIC EXERCISES: CPT | Mod: PN

## 2018-12-10 PROCEDURE — 97035 APP MDLTY 1+ULTRASOUND EA 15: CPT | Mod: PN

## 2018-12-10 PROCEDURE — 97140 MANUAL THERAPY 1/> REGIONS: CPT | Mod: 59,PN

## 2018-12-11 NOTE — PROGRESS NOTES
"Occupational Therapy Daily Notes    Patient:  Adamaris Vanegas  Date of Therapy Visit:  12/10/2018  Referring Physician: Scot  Diagnosis: Right Lateral Epicondylitis   MRN : [id number]  Referral Orders:  Eval and treat     Start Time: 400pm  End Time: 515pm  Total Time:  75 mins    Certification period to 12/31/18  Visit # 4 of 20  G CODE TOOL: FOTO  Category: self care/ carrying  Current Score 11/19/18  = 50% impaired  Goal at Discharge Score = 35% impaired      HISTORY/OCCUPATIONAL PROFILE/ Medical and Therapy History:  Subjective:  Patient is a 48 year old right hand dominant female who presents for occupational therapy with a diagnosis of right lateral epicondylitis.  She states that in July of 2018 she started having discomfort while lifting things. 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 In September, she had a cortisone injection which helped for several weeks but now the pain has returned.  Patient's chief complaint is pain.  She is noticing she is starting to avoid activities due to pain such as carrying a gallon of milk and flat ironing hair.     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, lifting teacher bag   Workmen's Compensation:no        FUNCTIONAL STATUS:   Previous functional status includes: Independent with all ADLs and ambulating without assistance   Current FunctionalStatus: independent with all ADLs with compensation   Home/Living environment :  Lives with  and 13 and 16 " years old and dog   Limitation of Functional Status:   ADLs (difficulty with the following tasks):    - Meal Prep: lifting coffee pot, carry/lift a pot of food    - Grooming: flat ironing hair, brushing hair    - shopping: lifting groceries, grasping items from shelf    - computer: using mouse    - housework & basic home maintenance: sweeping      Objective:  Edema/ Girth/Volume: Pre-Treatment Girth (cm):     Date: 11/19/18 11/19/18    Right Left   Elbow 25.1 25.2     Observations:        Strength: (SANTIAGO Dynamometer in lbs.), Average 3 trials, Position II             RIGHT           LEFT      Date:   11/19/18 11/19/18      Gross  II Elbow flex 52,49,50# 60,61,61#  GG right is 81% of left     Gross  II Elbow ext 20,20,20# 61,62,62#  GG right elbow extended is 32% of left     Treatment:  MODALITIES:  -MH x 2 elbow and hand with paraffin Right hand  -CUS continuous ultrasound 1mhz x 8' @ 1.1w/cm2  to Right Lateral epicondylitis to enhance the mobility of the underlying soft tissue and to decrease pain  MANUAL THERAPY X 40 mins: to decrease pain, increase ROM and functional use  -IASTM forearm extensors x 10  -STM to forearm extensors x 5  -DFM to ECRB insertion x 5  --ESTIM premod, sweep on,  HZ; cycle time 5/5, CV/CV x 9.6Volts CV x 20 mins to dorsal forearm extensors to decrease inflammation and decrease pain  THERAPEUTIC EXERCISES x 15 mins: to increase ROM, increase strength and increase functional use  -bilateral passive forearm stretches  -activity modification review for work and kitchen activities    Assessment:   Medical necessity is demonstrated by the following IMPAIRMENTS/PROBLEMS:  Patient Lack of Knowledge/Awareness in Home Program  Increased Pain in right  Decreased functional use/ unable to perform ADLs/iADLs  Decreased  strength    Adamaris presents to occupational therapy with an OT diagnosis of right lateral epicondylitis and presents with the above listed problem areas. Adamaris  states she had significant pain relief after last session.  She is more compliant with cold packs.  She is not performing passive stretches correctly.  We re-reviewed these and she was able to independently demonstrate these before leaving therapy today.  The kinesiotape lasted 4 days this time.  She was encouraged to continue with her home program in order to help decrease her symptoms.  The patient requires skilled occupational therapy to address the problems identified above and to achieve the individual patient goals as outlined in the problems and goals section.      GOALS:  Short Therm Goals: (2 weeks)    STG: Patient will be independent in home exercise and self care program    STG : Symptomatic Improvements: Decreasing Pain: to 5/10 for increased activity tolerance    STG: Patient to be IND with HEP and modalities for pain management   Long Term Goals: (8-10 weeks)   LTG: Decrease complaints of pain to 0 out of 10 to increase tolerance for ADLs    LTG: Increase independence in the following ADLs/iADLs: self care to brush hair with right   LTG: Increase functional use of  Right to increase independence with using a flat iron for hair   LTG: Increase  strength of right by 5#  to grasp/hold pot and perform entire cooking task   LTG:  Patient will show overall improved functional ability with upper extremity by achieveing a FOTO (Focus on Therappeutic Outcomes) score of less than or equal to 35%    Pt's spiritual, cultural and educational needs considered and patient is agreeable to plan of care and goals as stated above.     There are no Anticipated Barriers for Occupational  therapy      Plan:   Patient to be treated by Occupational Therapy 2 times per week for 8 weeks  to achieve the established goals. Treatment to include modalities including but not limited to paraffin, fluidotherapy, moist heat pack, edema control techniques, A/PROM, Manual therapy/mobilizations, Therapeutic exercises/activities,  ultrasound, scar maturation techniques, desensitization, strengthening, neural mobilizations/nerve gliding, stretching as well as any other treatments deemed necessary based on the patient's needs or progress.                     I certify the need for these services furnished under this plan of treatment and while under my care    ____________________________________                        ______________  Physician/Referring Practitioner                   Date of Signature

## 2018-12-12 ENCOUNTER — CLINICAL SUPPORT (OUTPATIENT)
Dept: REHABILITATION | Facility: HOSPITAL | Age: 48
End: 2018-12-12
Attending: ORTHOPAEDIC SURGERY
Payer: COMMERCIAL

## 2018-12-12 DIAGNOSIS — R29.898 DECREASED GRIP STRENGTH OF RIGHT HAND: ICD-10-CM

## 2018-12-12 DIAGNOSIS — R68.89 DECREASED FUNCTIONAL ACTIVITY TOLERANCE: ICD-10-CM

## 2018-12-12 DIAGNOSIS — M25.521 RIGHT ELBOW PAIN: Primary | ICD-10-CM

## 2018-12-12 PROCEDURE — 97140 MANUAL THERAPY 1/> REGIONS: CPT | Mod: 59,PN

## 2018-12-12 PROCEDURE — 97110 THERAPEUTIC EXERCISES: CPT | Mod: PN

## 2018-12-12 PROCEDURE — 97035 APP MDLTY 1+ULTRASOUND EA 15: CPT | Mod: PN

## 2018-12-13 NOTE — PROGRESS NOTES
"Occupational Therapy Daily Notes    Patient:  Adamaris Vanegas  Date of Therapy Visit:  12/12/2018  Referring Physician: Scot  Diagnosis: Right Lateral Epicondylitis   MRN : [id number]  Referral Orders:  Eval and treat     Start Time: 400pm  End Time: 515pm  Total Time:  75 mins    Certification period to 12/31/18  Visit # 4 of 20  G CODE TOOL: FOTO  Category: self care/ carrying  Current Score 11/19/18  = 50% impaired  Goal at Discharge Score = 35% impaired      HISTORY/OCCUPATIONAL PROFILE/ Medical and Therapy History:  Subjective:  Patient is a 48 year old right hand dominant female who presents for occupational therapy with a diagnosis of right lateral epicondylitis.  She states that in July of 2018 she started having discomfort while lifting things. 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 In September, she had a cortisone injection which helped for several weeks but now the pain has returned.  Patient's chief complaint is pain.  She is noticing she is starting to avoid activities due to pain such as carrying a gallon of milk and flat ironing hair.     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, lifting teacher bag   Workmen's Compensation:no        FUNCTIONAL STATUS:   Previous functional status includes: Independent with all ADLs and ambulating without assistance   Current FunctionalStatus: independent with all ADLs with compensation   Home/Living environment :  Lives with  and 13 " and 16 years old and dog   Limitation of Functional Status:   ADLs (difficulty with the following tasks):    - Meal Prep: lifting coffee pot, carry/lift a pot of food    - Grooming: flat ironing hair, brushing hair    - shopping: lifting groceries, grasping items from shelf    - computer: using mouse    - housework & basic home maintenance: sweeping      Objective:  Edema/ Girth/Volume: Pre-Treatment Girth (cm):     Date: 11/19/18 11/19/18    Right Left   Elbow 25.1 25.2     Observations:        Strength: (SANTIAGO Dynamometer in lbs.), Average 3 trials, Position II             RIGHT           LEFT      Date:   11/19/18 11/19/18      Gross  II Elbow flex 52,49,50# 60,61,61#  GG right is 81% of left     Gross  II Elbow ext 20,20,20# 61,62,62#  GG right elbow extended is 32% of left     Treatment:  MODALITIES:  -MH x 2 elbow and hand with paraffin Right hand  -CUS continuous ultrasound 1mhz x 8' @ 1.1w/cm2  to Right Lateral epicondylitis to enhance the mobility of the underlying soft tissue and to decrease pain  MANUAL THERAPY X 40 mins: to decrease pain, increase ROM and functional use  -IASTM forearm extensors x 10  -STM to forearm extensors x 5  -DFM to ECRB insertion x 5  --ESTIM premod, sweep on,  HZ; cycle time 5/5, CV/CV x 9.6Volts CV x 20 mins to dorsal forearm extensors to decrease inflammation and decrease pain  THERAPEUTIC EXERCISES x 15 mins: to increase ROM, increase strength and increase functional use  -bilateral passive forearm stretches  -activity modification review for work and kitchen activities    Assessment:   Medical necessity is demonstrated by the following IMPAIRMENTS/PROBLEMS:  Patient Lack of Knowledge/Awareness in Home Program  Increased Pain in right  Decreased functional use/ unable to perform ADLs/iADLs  Decreased  strength    Adamaris presents to occupational therapy with an OT diagnosis of right lateral epicondylitis and presents with the above listed problem areas.  Adamaris continues to have pain relief after therapy but she cannot remember to do her stretches.  She is asking how she can remind herself.  I encouraged her to use alarms throughout the day on her cell phone as reminders. We re-reviewed these and she was able to independently demonstrate these before leaving therapy today.  She is not wearing the counterforce strap.   The kinesiotape continues to assist with pain as well.   She agreed to place the alarms on her phone and was encouraged to continue with her home program in order to help decrease her symptoms.  Trigger points throughout her extensor forearm mass is decreasing as well.  The patient requires skilled occupational therapy to address the problems identified above and to achieve the individual patient goals as outlined in the problems and goals section.      GOALS:  Short Therm Goals: (2 weeks)    STG: Patient will be independent in home exercise and self care program    STG : Symptomatic Improvements: Decreasing Pain: to 5/10 for increased activity tolerance    STG: Patient to be IND with HEP and modalities for pain management   Long Term Goals: (8-10 weeks)   LTG: Decrease complaints of pain to 0 out of 10 to increase tolerance for ADLs    LTG: Increase independence in the following ADLs/iADLs: self care to brush hair with right   LTG: Increase functional use of  Right to increase independence with using a flat iron for hair   LTG: Increase  strength of right by 5#  to grasp/hold pot and perform entire cooking task   LTG:  Patient will show overall improved functional ability with upper extremity by achieveing a FOTO (Focus on Therappeutic Outcomes) score of less than or equal to 35%    Pt's spiritual, cultural and educational needs considered and patient is agreeable to plan of care and goals as stated above.     There are no Anticipated Barriers for Occupational  therapy      Plan:   Patient to be treated by Occupational Therapy 2 times per week for  8 weeks  to achieve the established goals. Treatment to include modalities including but not limited to paraffin, fluidotherapy, moist heat pack, edema control techniques, A/PROM, Manual therapy/mobilizations, Therapeutic exercises/activities, ultrasound, scar maturation techniques, desensitization, strengthening, neural mobilizations/nerve gliding, stretching as well as any other treatments deemed necessary based on the patient's needs or progress.                     I certify the need for these services furnished under this plan of treatment and while under my care    ____________________________________                        ______________  Physician/Referring Practitioner                   Date of Signature

## 2018-12-17 ENCOUNTER — CLINICAL SUPPORT (OUTPATIENT)
Dept: REHABILITATION | Facility: HOSPITAL | Age: 48
End: 2018-12-17
Attending: ORTHOPAEDIC SURGERY
Payer: COMMERCIAL

## 2018-12-17 DIAGNOSIS — M25.521 RIGHT ELBOW PAIN: Primary | ICD-10-CM

## 2018-12-17 DIAGNOSIS — R68.89 DECREASED FUNCTIONAL ACTIVITY TOLERANCE: ICD-10-CM

## 2018-12-17 DIAGNOSIS — R29.898 DECREASED GRIP STRENGTH OF RIGHT HAND: ICD-10-CM

## 2018-12-17 PROCEDURE — 97035 APP MDLTY 1+ULTRASOUND EA 15: CPT | Mod: PN

## 2018-12-17 PROCEDURE — 97140 MANUAL THERAPY 1/> REGIONS: CPT | Mod: 59,PN

## 2018-12-17 PROCEDURE — 97110 THERAPEUTIC EXERCISES: CPT | Mod: PN

## 2018-12-17 NOTE — PROGRESS NOTES
"Occupational Therapy Daily Notes    Patient:  Adamaris Vanegas  Date of Therapy Visit:  12/17/2018  Referring Physician: Scot  Diagnosis: Right Lateral Epicondylitis   MRN : [id number]  Referral Orders:  Eval and treat     Start Time: 400pm  End Time: 515pm  Total Time:  75 mins    Certification period to 12/31/18  Visit # 7 of 20  G CODE TOOL: FOTO  Category: self care/ carrying  Current Score 11/19/18  = 50% impaired  Goal at Discharge Score = 35% impaired      HISTORY/OCCUPATIONAL PROFILE/ Medical and Therapy History:  Subjective:  Patient is a 48 year old right hand dominant female who presents for occupational therapy with a diagnosis of right lateral epicondylitis.  She states that in July of 2018 she started having discomfort while lifting things. 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 In September, she had a cortisone injection which helped for several weeks but now the pain has returned.  Patient's chief complaint is pain.  She is noticing she is starting to avoid activities due to pain such as carrying a gallon of milk and flat ironing hair.     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, lifting teacher bag   Workmen's Compensation:no        FUNCTIONAL STATUS:   Previous functional status includes: Independent with all ADLs and ambulating without assistance   Current FunctionalStatus: independent with all ADLs with compensation   Home/Living environment :  Lives with "  and 13 and 16 years old and dog   Limitation of Functional Status:   ADLs (difficulty with the following tasks):    - Meal Prep: lifting coffee pot, carry/lift a pot of food    - Grooming: flat ironing hair, brushing hair    - shopping: lifting groceries, grasping items from shelf    - computer: using mouse    - housework & basic home maintenance: sweeping      Objective:  Edema/ Girth/Volume: Pre-Treatment Girth (cm):     Date: 11/19/18 11/19/18    Right Left   Elbow 25.1 25.2     Observations:        Strength: (SANTIAGO Dynamometer in lbs.), Average 3 trials, Position II             RIGHT           LEFT      Date:   11/19/18 11/19/18      Gross  II Elbow flex 52,49,50# 60,61,61#  GG right is 81% of left     Gross  II Elbow ext 20,20,20# 61,62,62#  GG right elbow extended is 32% of left     Treatment:  MODALITIES:  -MH x 2 elbow and hand with paraffin Right hand  -CUS continuous ultrasound 1mhz x 8' @ 1.1w/cm2  to Right Lateral epicondylitis to enhance the mobility of the underlying soft tissue and to decrease pain  MANUAL THERAPY X 40 mins: to decrease pain, increase ROM and functional use  -IASTM forearm extensors x 10  -STM to forearm extensors x 5  -DFM to ECRB insertion x 5  --ESTIM premod, sweep on,  HZ; cycle time 5/5, CV/CV x 9.6Volts CV x 20 mins to dorsal forearm extensors to decrease inflammation and decrease pain  THERAPEUTIC EXERCISES x 15 mins: to increase ROM, increase strength and increase functional use  -bilateral passive forearm stretches  -activity modification review for work and kitchen activities    Assessment:   Medical necessity is demonstrated by the following IMPAIRMENTS/PROBLEMS:  Patient Lack of Knowledge/Awareness in Home Program  Increased Pain in right  Decreased functional use/ unable to perform ADLs/iADLs  Decreased  strength    Adamaris presents to occupational therapy with an OT diagnosis of right lateral epicondylitis and presents with the above listed  problem areas. Adamaris has had significant pain relief.  She is performing her stretches using alarms throughout the day on her cell phone as reminders. Functional progress is noted as well as she does not have pain when pulling up her pants.  We continued with kinesiotape.  I encouraged her to continue with her program even though her pain is becoming less.  She understands to continue with icing, stretching, counterforce strap and adapted body mechanics.  The patient requires skilled occupational therapy to address the problems identified above and to achieve the individual patient goals as outlined in the problems and goals section.      GOALS:  Short Therm Goals: (2 weeks)    STG: Patient will be independent in home exercise and self care program    STG : Symptomatic Improvements: Decreasing Pain: to 5/10 for increased activity tolerance    STG: Patient to be IND with HEP and modalities for pain management   Long Term Goals: (8-10 weeks)   LTG: Decrease complaints of pain to 0 out of 10 to increase tolerance for ADLs    LTG: Increase independence in the following ADLs/iADLs: self care to brush hair with right  MET   LTG: Increase functional use of  Right to increase independence with using a flat iron for hair   LTG: Increase  strength of right by 5#  to grasp/hold pot and perform entire cooking task   LTG:  Patient will show overall improved functional ability with upper extremity by achieveing a FOTO (Focus on Therappeutic Outcomes) score of less than or equal to 35%    Pt's spiritual, cultural and educational needs considered and patient is agreeable to plan of care and goals as stated above.     There are no Anticipated Barriers for Occupational  therapy      Plan:   Patient to be treated by Occupational Therapy 2 times per week for 8 weeks  to achieve the established goals. Treatment to include modalities including but not limited to paraffin, fluidotherapy, moist heat pack, edema control techniques,  A/PROM, Manual therapy/mobilizations, Therapeutic exercises/activities, ultrasound, scar maturation techniques, desensitization, strengthening, neural mobilizations/nerve gliding, stretching as well as any other treatments deemed necessary based on the patient's needs or progress.                     I certify the need for these services furnished under this plan of treatment and while under my care    ____________________________________                        ______________  Physician/Referring Practitioner                   Date of Signature

## 2018-12-19 ENCOUNTER — CLINICAL SUPPORT (OUTPATIENT)
Dept: REHABILITATION | Facility: HOSPITAL | Age: 48
End: 2018-12-19
Attending: ORTHOPAEDIC SURGERY
Payer: COMMERCIAL

## 2018-12-19 DIAGNOSIS — R68.89 DECREASED FUNCTIONAL ACTIVITY TOLERANCE: ICD-10-CM

## 2018-12-19 DIAGNOSIS — M25.521 RIGHT ELBOW PAIN: Primary | ICD-10-CM

## 2018-12-19 DIAGNOSIS — R29.898 DECREASED GRIP STRENGTH OF RIGHT HAND: ICD-10-CM

## 2018-12-19 PROCEDURE — 97035 APP MDLTY 1+ULTRASOUND EA 15: CPT | Mod: PN

## 2018-12-19 PROCEDURE — 97140 MANUAL THERAPY 1/> REGIONS: CPT | Mod: PN

## 2018-12-19 PROCEDURE — 97110 THERAPEUTIC EXERCISES: CPT | Mod: PN

## 2018-12-19 NOTE — PROGRESS NOTES
"Occupational Therapy Daily Notes    Patient:  Adamaris Vanegas  Date of Therapy Visit:  12/19/2018  Referring Physician: Scot  Diagnosis: Right Lateral Epicondylitis   MRN : [id number]  Referral Orders:  Eval and treat     Start Time: 400pm  End Time: 515pm  Total Time:  75 mins    Certification period to 12/31/18  Visit # 8 of 20  G CODE TOOL: FOTO  Category: self care/ carrying  Current Score 11/19/18  = 50% impaired  Goal at Discharge Score = 35% impaired      HISTORY/OCCUPATIONAL PROFILE/ Medical and Therapy History:  Subjective:  Patient is a 48 year old right hand dominant female who presents for occupational therapy with a diagnosis of right lateral epicondylitis.  She states that in July of 2018 she started having discomfort while lifting things. 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 In September, she had a cortisone injection which helped for several weeks but now the pain has returned.  Patient's chief complaint is pain.  She is noticing she is starting to avoid activities due to pain such as carrying a gallon of milk and flat ironing hair.     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, lifting teacher bag   Workmen's Compensation:no        FUNCTIONAL STATUS:   Previous functional status includes: Independent with all ADLs and ambulating without assistance   Current FunctionalStatus: independent with all ADLs with compensation   Home/Living environment :  Lives with  and 13 and 16 years old and " dog   Limitation of Functional Status:   ADLs (difficulty with the following tasks):    - Meal Prep: lifting coffee pot, carry/lift a pot of food    - Grooming: flat ironing hair, brushing hair    - shopping: lifting groceries, grasping items from shelf    - computer: using mouse    - housework & basic home maintenance: sweeping      Objective:  Edema/ Girth/Volume: Pre-Treatment Girth (cm):     Date: 11/19/18 11/19/18    Right Left   Elbow 25.1 25.2     Observations:        Strength: (SANTIAGO Dynamometer in lbs.), Average 3 trials, Position II             RIGHT           LEFT      Date:   11/19/18 11/19/18      Gross  II Elbow flex 52,49,50# 60,61,61#  GG right is 81% of left     Gross  II Elbow ext 20,20,20# 61,62,62#  GG right elbow extended is 32% of left     Treatment:  MODALITIES:  -MH x 2 elbow and hand with paraffin Right hand  -CUS continuous ultrasound 1mhz x 8' @ 1.1w/cm2  to Right Lateral epicondylitis to enhance the mobility of the underlying soft tissue and to decrease pain  MANUAL THERAPY X 40 mins: to decrease pain, increase ROM and functional use  -IASTM forearm extensors x 10  -STM to forearm extensors x 5  -DFM to ECRB insertion x 5  --ESTIM premod, sweep on,  HZ; cycle time 5/5, CV/CV x 9.6Volts CV x 20 mins to dorsal forearm extensors to decrease inflammation and decrease pain  THERAPEUTIC EXERCISES x 15 mins: to increase ROM, increase strength and increase functional use  -bilateral passive forearm stretches  -activity modification review for work and kitchen activities    Assessment:   Medical necessity is demonstrated by the following IMPAIRMENTS/PROBLEMS:  Patient Lack of Knowledge/Awareness in Home Program  Increased Pain in right  Decreased functional use/ unable to perform ADLs/iADLs  Decreased  strength    Adamaris presents to occupational therapy with an OT diagnosis of right lateral epicondylitis and presents with the above listed problem areas. Adamaris continues to have  pain relief.  She is compliant with her passive stretching program regularly now.  We continued with kinesiotape.  I encouraged her to continue with her program even though her pain is becoming less.  She understands to continue with icing, stretching, counterforce strap and adapted body mechanics.  The patient requires skilled occupational therapy to address the problems identified above and to achieve the individual patient goals as outlined in the problems and goals section.      GOALS:  Short Therm Goals: (2 weeks)    STG: Patient will be independent in home exercise and self care program    STG : Symptomatic Improvements: Decreasing Pain: to 5/10 for increased activity tolerance    STG: Patient to be IND with HEP and modalities for pain management   Long Term Goals: (8-10 weeks)   LTG: Decrease complaints of pain to 0 out of 10 to increase tolerance for ADLs    LTG: Increase independence in the following ADLs/iADLs: self care to brush hair with right  MET   LTG: Increase functional use of  Right to increase independence with using a flat iron for hair   LTG: Increase  strength of right by 5#  to grasp/hold pot and perform entire cooking task   LTG:  Patient will show overall improved functional ability with upper extremity by achieveing a FOTO (Focus on Therappeutic Outcomes) score of less than or equal to 35%    Pt's spiritual, cultural and educational needs considered and patient is agreeable to plan of care and goals as stated above.     There are no Anticipated Barriers for Occupational  therapy      Plan:   Patient to be treated by Occupational Therapy 2 times per week for 8 weeks  to achieve the established goals. Treatment to include modalities including but not limited to paraffin, fluidotherapy, moist heat pack, edema control techniques, A/PROM, Manual therapy/mobilizations, Therapeutic exercises/activities, ultrasound, scar maturation techniques, desensitization, strengthening, neural  mobilizations/nerve gliding, stretching as well as any other treatments deemed necessary based on the patient's needs or progress.                     I certify the need for these services furnished under this plan of treatment and while under my care    ____________________________________                        ______________  Physician/Referring Practitioner                   Date of Signature

## 2018-12-26 ENCOUNTER — CLINICAL SUPPORT (OUTPATIENT)
Dept: REHABILITATION | Facility: HOSPITAL | Age: 48
End: 2018-12-26
Attending: ORTHOPAEDIC SURGERY
Payer: COMMERCIAL

## 2018-12-26 DIAGNOSIS — R68.89 DECREASED FUNCTIONAL ACTIVITY TOLERANCE: ICD-10-CM

## 2018-12-26 DIAGNOSIS — M25.521 RIGHT ELBOW PAIN: Primary | ICD-10-CM

## 2018-12-26 DIAGNOSIS — R29.898 DECREASED GRIP STRENGTH OF RIGHT HAND: ICD-10-CM

## 2018-12-26 PROCEDURE — 97035 APP MDLTY 1+ULTRASOUND EA 15: CPT | Mod: PN

## 2018-12-26 PROCEDURE — 97140 MANUAL THERAPY 1/> REGIONS: CPT | Mod: 59,PN

## 2018-12-26 PROCEDURE — 97110 THERAPEUTIC EXERCISES: CPT | Mod: PN

## 2018-12-26 NOTE — PROGRESS NOTES
"Occupational Therapy Daily Notes    Patient:  Adamaris Vanegas  Date of Therapy Visit:  12/26/2018  Referring Physician: Scot  Diagnosis: Right Lateral Epicondylitis   MRN : [id number]  Referral Orders:  Eval and treat     Start Time: 1115am  End Time: 1215pm  Total Time:  60 mins    Certification period to 12/31/18  Visit # 9 of 20  G CODE TOOL: FOTO  Category: self care/ carrying  Current Score 11/19/18  = 50% impaired  Goal at Discharge Score = 35% impaired      HISTORY/OCCUPATIONAL PROFILE/ Medical and Therapy History:  Subjective:  Patient is a 48 year old right hand dominant female who presents for occupational therapy with a diagnosis of right lateral epicondylitis.  She states that in July of 2018 she started having discomfort while lifting things. 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 In September, she had a cortisone injection which helped for several weeks but now the pain has returned.  Patient's chief complaint is pain.  She is noticing she is starting to avoid activities due to pain such as carrying a gallon of milk and flat ironing hair.     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, lifting teacher bag   Workmen's Compensation:no        FUNCTIONAL STATUS:   Previous functional status includes: Independent with all ADLs and ambulating without assistance   Current FunctionalStatus: independent with all ADLs with compensation   Home/Living environment " :  Lives with  and 13 and 16 years old and dog   Limitation of Functional Status:   ADLs (difficulty with the following tasks):    - Meal Prep: lifting coffee pot, carry/lift a pot of food    - Grooming: flat ironing hair, brushing hair    - shopping: lifting groceries, grasping items from shelf    - computer: using mouse    - housework & basic home maintenance: sweeping      Objective:  Edema/ Girth/Volume: Pre-Treatment Girth (cm):     Date: 11/19/18 11/19/18    Right Left   Elbow 25.1 25.2     Observations:        Strength: (SANTIAGO Dynamometer in lbs.), Average 3 trials, Position II             RIGHT           LEFT      Date:   11/19/18 11/19/18      Gross  II Elbow flex 52,49,50# 60,61,61#  GG right is 81% of left     Gross  II Elbow ext 20,20,20# 61,62,62#  GG right elbow extended is 32% of left     Treatment:  MODALITIES:  -MH x 2 elbow and hand with paraffin Right hand  -CUS continuous ultrasound 1mhz x 8' @ 1.1w/cm2  to Right Lateral epicondylitis to enhance the mobility of the underlying soft tissue and to decrease pain  MANUAL THERAPY X 40 mins: to decrease pain, increase ROM and functional use  -IASTM forearm extensors x 10  -STM to forearm extensors x 5  -DFM to ECRB insertion x 5  --ESTIM premod, sweep on,  HZ; cycle time 5/5, CV/CV x 9.6Volts CV x 20 mins to dorsal forearm extensors to decrease inflammation and decrease pain  THERAPEUTIC EXERCISES x 15 mins: to increase ROM, increase strength and increase functional use  -bilateral passive forearm stretches  -activity modification review for work and kitchen activities    Assessment:   Medical necessity is demonstrated by the following IMPAIRMENTS/PROBLEMS:  Patient Lack of Knowledge/Awareness in Home Program  Increased Pain in right  Decreased functional use/ unable to perform ADLs/iADLs  Decreased  strength    Adamaris presents to occupational therapy with an OT diagnosis of right lateral epicondylitis and presents with the  above listed problem areas. Adamaris has had an increase in pain over the holidays.  She has not been compliant with her HEP.  She was encouraged to return to performing her passive stretching program and icing regularly now.  She is going out of town and was encouraged to take her ice packs with her.  We continued with kinesiotape. She understands to continue with icing, stretching, counterforce strap and adapted body mechanics.  The patient requires skilled occupational therapy to address the problems identified above and to achieve the individual patient goals as outlined in the problems and goals section.      GOALS:  Short Therm Goals: (2 weeks)    STG: Patient will be independent in home exercise and self care program    STG : Symptomatic Improvements: Decreasing Pain: to 5/10 for increased activity tolerance    STG: Patient to be IND with HEP and modalities for pain management   Long Term Goals: (8-10 weeks)   LTG: Decrease complaints of pain to 0 out of 10 to increase tolerance for ADLs    LTG: Increase independence in the following ADLs/iADLs: self care to brush hair with right  MET   LTG: Increase functional use of  Right to increase independence with using a flat iron for hair   LTG: Increase  strength of right by 5#  to grasp/hold pot and perform entire cooking task   LTG:  Patient will show overall improved functional ability with upper extremity by achieveing a FOTO (Focus on Therappeutic Outcomes) score of less than or equal to 35%    Pt's spiritual, cultural and educational needs considered and patient is agreeable to plan of care and goals as stated above.     There are no Anticipated Barriers for Occupational  therapy      Plan:   Patient to be treated by Occupational Therapy 2 times per week for 8 weeks  to achieve the established goals. Treatment to include modalities including but not limited to paraffin, fluidotherapy, moist heat pack, edema control techniques, A/PROM, Manual  therapy/mobilizations, Therapeutic exercises/activities, ultrasound, scar maturation techniques, desensitization, strengthening, neural mobilizations/nerve gliding, stretching as well as any other treatments deemed necessary based on the patient's needs or progress.                     I certify the need for these services furnished under this plan of treatment and while under my care    ____________________________________                        ______________  Physician/Referring Practitioner                   Date of Signature

## 2018-12-31 ENCOUNTER — CLINICAL SUPPORT (OUTPATIENT)
Dept: REHABILITATION | Facility: HOSPITAL | Age: 48
End: 2018-12-31
Attending: ORTHOPAEDIC SURGERY
Payer: COMMERCIAL

## 2018-12-31 DIAGNOSIS — M25.521 RIGHT ELBOW PAIN: Primary | ICD-10-CM

## 2018-12-31 DIAGNOSIS — R68.89 DECREASED FUNCTIONAL ACTIVITY TOLERANCE: ICD-10-CM

## 2018-12-31 DIAGNOSIS — R29.898 DECREASED GRIP STRENGTH OF RIGHT HAND: ICD-10-CM

## 2018-12-31 PROCEDURE — 97110 THERAPEUTIC EXERCISES: CPT | Mod: PN

## 2018-12-31 PROCEDURE — 97140 MANUAL THERAPY 1/> REGIONS: CPT | Mod: 59,PN

## 2018-12-31 PROCEDURE — 97035 APP MDLTY 1+ULTRASOUND EA 15: CPT | Mod: PN

## 2018-12-31 NOTE — PROGRESS NOTES
"Occupational Therapy Daily Notes    Patient:  Adamaris Vanegas  Date of Therapy Visit:  12/31/2018  Referring Physician: Scot  Diagnosis: Right Lateral Epicondylitis   MRN : [id number]  Referral Orders:  Eval and treat     Start Time: 1000am  End Time: 1115am  Total Time:  75 mins    Certification period to 12/31/18  Visit # 10 of 20  G CODE TOOL: FOTO  Category: self care/ carrying  Current Score 11/19/18  = 50% impaired  Goal at Discharge Score = 35% impaired      HISTORY/OCCUPATIONAL PROFILE/ Medical and Therapy History:  Subjective:  Patient is a 48 year old right hand dominant female who presents for occupational therapy with a diagnosis of right lateral epicondylitis.  She states that in July of 2018 she started having discomfort while lifting things. 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 In September, she had a cortisone injection which helped for several weeks but now the pain has returned.  Patient's chief complaint is pain.  She is noticing she is starting to avoid activities due to pain such as carrying a gallon of milk and flat ironing hair.     Daily Comments:  "I am feeling really good; I think I am ready to be discharged"  Pain:   On arrival to therapy:    0 out of 10   While working/ With Activity:  0 out of 10   When leaving therapy:  0 out of 10      Occupation:   Working presently: yes full time  Patient's work/Activities consist of: daily computer desk top with mouse, write with marker on dry erase board, lifting teacher bag   Workmen's Compensation:no        FUNCTIONAL STATUS:   Previous functional status includes: Independent with all ADLs and ambulating without assistance   Current FunctionalStatus: independent with all ADLs with compensation   Home/Living environment :  Lives with  and 13 and 16 years old and dog   Limitation of Functional Status:   ADLs (difficulty with the following " tasks):    - Meal Prep: lifting coffee pot, carry/lift a pot of food (NO PAIN 12/31/18)    - Grooming: flat ironing hair, brushing hair (NO PAIN 12/31/18)    - shopping: lifting groceries, grasping items from shelf (NO PAIN 12/31/18)    - computer: using mouse    - housework & basic home maintenance: sweeping (has not performed 12/31/18)     Objective:  Edema/ Girth/Volume: Pre-Treatment Girth (cm):     Date: 11/19/18 11/19/18    Right Left   Elbow 25.1 25.2     Observations:        Strength: (SANTIAGO Dynamometer in lbs.), Average 3 trials, Position II             RIGHT           LEFT      Date:   11/19/18 11/19/18      Gross  II Elbow flex 52,49,50# 60,61,61#  GG right is 81% of left     Gross  II Elbow ext 20,20,20# 61,62,62#  GG right elbow extended is 32% of left     Treatment:  MODALITIES:  -MH x 2 elbow and hand with paraffin Right hand  -CUS continuous ultrasound 1mhz x 8' @ 1.1w/cm2  to Right Lateral epicondylitis to enhance the mobility of the underlying soft tissue and to decrease pain  MANUAL THERAPY X 40 mins: to decrease pain, increase ROM and functional use  -IASTM forearm extensors x 10  -STM to forearm extensors x 5  -DFM to ECRB insertion x 5  --ESTIM premod, sweep on,  HZ; cycle time 5/5, CV/CV x 9.6Volts CV x 20 mins to dorsal forearm extensors to decrease inflammation and decrease pain  THERAPEUTIC EXERCISES x 15 mins: to increase ROM, increase strength and increase functional use  -bilateral passive forearm stretches  -activity modification review for work and kitchen activities    Assessment:     Adamaris presents to occupational therapy with an OT diagnosis of right lateral epicondylitis.  Adamaris arrives today stating that she is doing grmeeat and has had no pain.  She has returned with being compliant with her HEP and feels her symptoms have resolved.  She feels ready to continue with her home exercises at home.    She was encouraged to return to performing her passive stretching  program and icing regularly now.  .  We continued with kinesiotape. She understands to continue with icing, stretching, counterforce strap and adapted body mechanics.  Patient has met all goals with no pain and is self discharging today.  The patient does not require further skilled occupational therapy.    GOALS:  Short Therm Goals: (2 weeks)    STG: Patient will be independent in home exercise and self care program MET    STG : Symptomatic Improvements: Decreasing Pain: to 5/10 for increased activity tolerance MET    STG: Patient to be IND with HEP and modalities for pain management  MET  Long Term Goals: (8-10 weeks)   LTG: Decrease complaints of pain to 0 out of 10 to increase tolerance for ADLs    LTG: Increase independence in the following ADLs/iADLs: self care to brush hair with right  MET   LTG: Increase functional use of  Right to increase independence with using a flat iron for hair MET   LTG: Increase  strength of right by 5#  to grasp/hold pot and perform entire cooking task MET   LTG:  Patient will show overall improved functional ability with upper extremity by achieveing a FOTO (Focus on Therappeutic Outcomes) score of less than or equal to 35%        Plan:   Patient has met all goals with no pain and is self discharging today.

## 2019-01-27 RX ORDER — ESCITALOPRAM OXALATE 10 MG/1
TABLET ORAL
Qty: 90 TABLET | Refills: 0 | Status: SHIPPED | OUTPATIENT
Start: 2019-01-27 | End: 2019-04-30 | Stop reason: SDUPTHER

## 2019-05-01 RX ORDER — ESCITALOPRAM OXALATE 10 MG/1
TABLET ORAL
Qty: 90 TABLET | Refills: 0 | Status: SHIPPED | OUTPATIENT
Start: 2019-05-01 | End: 2019-07-22 | Stop reason: SDUPTHER

## 2019-05-02 NOTE — TELEPHONE ENCOUNTER
Left message for pt to inform that her rx was sent to pharmacy and that she is due for a follow up and labs and to please call scheduling at [phone]

## 2019-05-06 NOTE — TELEPHONE ENCOUNTER
3rd message to pt to inform rx was filled and a follow up is needed.  Mailing reminder letter to pt.

## 2019-07-08 ENCOUNTER — PATIENT OUTREACH (OUTPATIENT)
Dept: ADMINISTRATIVE | Facility: HOSPITAL | Age: 49
End: 2019-07-08

## 2019-07-08 DIAGNOSIS — Z00.00 PREVENTATIVE HEALTH CARE: Primary | ICD-10-CM

## 2019-07-08 NOTE — LETTER
July 8, 2019    Adamaris Vanegas  [address] [facility] [address]             [facility]  [address]  [facility] [address]  Phone: [phone] Dear Mrs. Vanegas:    [facility] is committed to your overall health.  To help you get the most out of each of your visits, we will review your information to make sure you are up to date on all of your recommended tests and/or procedures.      Dr. Karissa Allen MD has found that your chart shows you may be due for     Health Maintenance Due   Topic    Lipid Panel      If you have had any of the above done at another facility, please bring the records or information with you so that your record at [facility] will be complete.  If you would like to schedule any of these, please contact me.    If you are currently taking medication, please bring it with you to your appointment for review.    MD Vidhi Parisi LPN Clinical Care Coordinator  [facility] [address] [facility] [address]  [place] [address]  [phone] (p)   [phone] (f)

## 2019-07-22 ENCOUNTER — LAB VISIT (OUTPATIENT)
Dept: LAB | Facility: HOSPITAL | Age: 49
End: 2019-07-22
Attending: FAMILY MEDICINE
Payer: COMMERCIAL

## 2019-07-22 ENCOUNTER — OFFICE VISIT (OUTPATIENT)
Dept: FAMILY MEDICINE | Facility: CLINIC | Age: 49
End: 2019-07-22
Payer: COMMERCIAL

## 2019-07-22 VITALS
RESPIRATION RATE: 18 BRPM | HEIGHT: 67 IN | TEMPERATURE: 99 F | HEART RATE: 70 BPM | SYSTOLIC BLOOD PRESSURE: 112 MMHG | WEIGHT: 152 LBS | OXYGEN SATURATION: 97 % | DIASTOLIC BLOOD PRESSURE: 76 MMHG | BODY MASS INDEX: 23.86 KG/M2

## 2019-07-22 DIAGNOSIS — F41.9 ANXIETY: ICD-10-CM

## 2019-07-22 DIAGNOSIS — Z71.6 ENCOUNTER FOR TOBACCO USE CESSATION COUNSELING: ICD-10-CM

## 2019-07-22 DIAGNOSIS — Z00.00 ROUTINE GENERAL MEDICAL EXAMINATION AT A HEALTH CARE FACILITY: Primary | ICD-10-CM

## 2019-07-22 DIAGNOSIS — Z00.00 PREVENTATIVE HEALTH CARE: ICD-10-CM

## 2019-07-22 LAB
BASOPHILS # BLD AUTO: 0.04 K/UL (ref 0–0.2)
BASOPHILS NFR BLD: 0.7 % (ref 0–1.9)
DIFFERENTIAL METHOD: ABNORMAL
EOSINOPHIL # BLD AUTO: 0.2 K/UL (ref 0–0.5)
EOSINOPHIL NFR BLD: 3.5 % (ref 0–8)
ERYTHROCYTE [DISTWIDTH] IN BLOOD BY AUTOMATED COUNT: 12.8 % (ref 11.5–14.5)
HCT VFR BLD AUTO: 40.3 % (ref 37–48.5)
HGB BLD-MCNC: 13.2 G/DL (ref 12–16)
IMM GRANULOCYTES # BLD AUTO: 0.01 K/UL (ref 0–0.04)
IMM GRANULOCYTES NFR BLD AUTO: 0.2 % (ref 0–0.5)
LYMPHOCYTES # BLD AUTO: 1.4 K/UL (ref 1–4.8)
LYMPHOCYTES NFR BLD: 25.5 % (ref 18–48)
MCH RBC QN AUTO: 31.4 PG (ref 27–31)
MCHC RBC AUTO-ENTMCNC: 32.8 G/DL (ref 32–36)
MCV RBC AUTO: 96 FL (ref 82–98)
MONOCYTES # BLD AUTO: 0.4 K/UL (ref 0.3–1)
MONOCYTES NFR BLD: 7.1 % (ref 4–15)
NEUTROPHILS # BLD AUTO: 3.5 K/UL (ref 1.8–7.7)
NEUTROPHILS NFR BLD: 63 % (ref 38–73)
NRBC BLD-RTO: 0 /100 WBC
PLATELET # BLD AUTO: 270 K/UL (ref 150–350)
PMV BLD AUTO: 10.9 FL (ref 9.2–12.9)
RBC # BLD AUTO: 4.2 M/UL (ref 4–5.4)
WBC # BLD AUTO: 5.5 K/UL (ref 3.9–12.7)

## 2019-07-22 PROCEDURE — 80061 LIPID PANEL: CPT

## 2019-07-22 PROCEDURE — 82306 VITAMIN D 25 HYDROXY: CPT

## 2019-07-22 PROCEDURE — 82607 VITAMIN B-12: CPT

## 2019-07-22 PROCEDURE — 85025 COMPLETE CBC W/AUTO DIFF WBC: CPT

## 2019-07-22 PROCEDURE — 36415 COLL VENOUS BLD VENIPUNCTURE: CPT | Mod: PN

## 2019-07-22 PROCEDURE — 99396 PREV VISIT EST AGE 40-64: CPT | Mod: S$GLB,,, | Performed by: FAMILY MEDICINE

## 2019-07-22 PROCEDURE — 99999 PR PBB SHADOW E&M-EST. PATIENT-LVL III: CPT | Mod: PBBFAC,,, | Performed by: FAMILY MEDICINE

## 2019-07-22 PROCEDURE — 84443 ASSAY THYROID STIM HORMONE: CPT

## 2019-07-22 PROCEDURE — 99999 PR PBB SHADOW E&M-EST. PATIENT-LVL III: ICD-10-PCS | Mod: PBBFAC,,, | Performed by: FAMILY MEDICINE

## 2019-07-22 PROCEDURE — 80053 COMPREHEN METABOLIC PANEL: CPT

## 2019-07-22 PROCEDURE — 99396 PR PREVENTIVE VISIT,EST,40-64: ICD-10-PCS | Mod: S$GLB,,, | Performed by: FAMILY MEDICINE

## 2019-07-22 RX ORDER — BUPROPION HYDROCHLORIDE 150 MG/1
150 TABLET ORAL DAILY
Qty: 30 TABLET | Refills: 2 | Status: SHIPPED | OUTPATIENT
Start: 2019-07-22 | End: 2019-10-19 | Stop reason: SDUPTHER

## 2019-07-22 RX ORDER — LORATADINE 10 MG/1
10 TABLET ORAL DAILY
COMMUNITY

## 2019-07-22 NOTE — PROGRESS NOTES
Subjective:       Patient ID: Adamaris Vanegas is a 49 y.o. female.    Chief Complaint: Annual Exam      Adamaris Vanegas is in the office for annual exam.    HPI  Medical hx reviewed.  Past Medical History:   Diagnosis Date    Anxiety      Phoebe-menopausal.  Still smoking a few cigs/week.     Current Outpatient Medications:     escitalopram oxalate (LEXAPRO) 10 MG tablet, TAKE 1 TABLET(10 MG) BY MOUTH EVERY DAY, Disp: 90 tablet, Rfl: 1    acyclovir (ZOVIRAX) 800 MG Tab, TAKE 1 TABLET(800 MG) BY MOUTH THREE TIMES DAILY, Disp: 15 tablet, Rfl: 0    alprazolam (XANAX) 0.5 MG tablet, Take 1 tablet (0.5 mg total) by mouth daily as needed for Anxiety., Disp: 10 tablet, Rfl: 0    The ASCVD Risk score (Enrique LAGOS Jr., et al., 2013) failed to calculate for the following reasons:    Cannot find a previous HDL lab    Cannot find a previous total cholesterol lab     No results found for: HGBA1C  Lab Results   Component Value Date    LDLCALC 104.0 04/03/2013    CREATININE 0.8 04/03/2013   labs 2013.    Review of Systems   Constitutional: Negative for activity change, fatigue, fever and unexpected weight change.   HENT: Negative for congestion, ear pain, hearing loss, postnasal drip, sneezing, sore throat and trouble swallowing.    Eyes: Negative for discharge and visual disturbance.   Respiratory: Negative for cough, shortness of breath and wheezing.    Cardiovascular: Negative for chest pain, palpitations and leg swelling.   Gastrointestinal: Negative for abdominal pain, blood in stool, constipation, diarrhea and nausea.   Genitourinary: Negative for decreased urine volume, difficulty urinating, dysuria, frequency (nighttime 0), hematuria, menstrual problem and urgency.   Musculoskeletal: Negative for arthralgias and back pain.   Skin: Negative for color change and rash.   Neurological: Negative for dizziness, weakness and headaches.   Psychiatric/Behavioral: Positive for dysphoric mood. Negative for agitation, decreased concentration  and sleep disturbance. The patient is not nervous/anxious.        Objective:      Physical Exam   Constitutional: She is oriented to person, place, and time. She appears well-developed and well-nourished. No distress.   HENT:   Head: Normocephalic and atraumatic.   Right Ear: External ear normal.   Left Ear: External ear normal.   Nose: Nose normal.   Mouth/Throat: Oropharynx is clear and moist. No oropharyngeal exudate.   Eyes: Pupils are equal, round, and reactive to light. Conjunctivae and EOM are normal. Right eye exhibits no discharge. Left eye exhibits no discharge. No scleral icterus.   Neck: Normal range of motion. Neck supple. No thyromegaly present.   Cardiovascular: Normal rate and regular rhythm.   Pulmonary/Chest: Effort normal and breath sounds normal. No respiratory distress. She has no wheezes.   Abdominal: Soft. Bowel sounds are normal. She exhibits no distension and no mass. There is no tenderness. There is no rebound and no guarding.   Musculoskeletal: Normal range of motion. She exhibits no edema.   Lymphadenopathy:     She has no cervical adenopathy.   Neurological: She is alert and oriented to person, place, and time. No cranial nerve deficit.   Skin: Skin is warm and dry. No rash noted.   Psychiatric: She has a normal mood and affect. Her behavior is normal.   Nursing note and vitals reviewed.          Screening recommendations appropriate to age and health status were reviewed.    Assessment & Plan:    Routine general medical examination at a health care facility  Anticipatory guidance reviewed.  Encounter for tobacco use cessation counseling  -     buPROPion (WELLBUTRIN XL) 150 MG TB24 tablet; Take 1 tablet (150 mg total) by mouth once daily.  Dispense: 30 tablet; Refill: 2  Counseling of tobacco cessation in office. Patient aware of risks of tobacco abuse and benefits of cessation. Review of goals, quit date, behavior modification. We reviewed adjuncts to cessation incl gum, patch and  SNRI's such as wellbutrin and chantix.   Anxiety  Cont lexapro.

## 2019-07-23 DIAGNOSIS — E83.52 SERUM CALCIUM ELEVATED: Primary | ICD-10-CM

## 2019-07-23 LAB
25(OH)D3+25(OH)D2 SERPL-MCNC: 45 NG/ML (ref 30–96)
ALBUMIN SERPL BCP-MCNC: 4.5 G/DL (ref 3.5–5.2)
ALP SERPL-CCNC: 69 U/L (ref 55–135)
ALT SERPL W/O P-5'-P-CCNC: 18 U/L (ref 10–44)
ANION GAP SERPL CALC-SCNC: 10 MMOL/L (ref 8–16)
AST SERPL-CCNC: 23 U/L (ref 10–40)
BILIRUB SERPL-MCNC: 0.4 MG/DL (ref 0.1–1)
BUN SERPL-MCNC: 10 MG/DL (ref 6–20)
CALCIUM SERPL-MCNC: 10.9 MG/DL (ref 8.7–10.5)
CHLORIDE SERPL-SCNC: 99 MMOL/L (ref 95–110)
CHOLEST SERPL-MCNC: 244 MG/DL (ref 120–199)
CHOLEST SERPL-MCNC: 244 MG/DL (ref 120–199)
CHOLEST/HDLC SERPL: 2.3 {RATIO} (ref 2–5)
CHOLEST/HDLC SERPL: 2.3 {RATIO} (ref 2–5)
CO2 SERPL-SCNC: 29 MMOL/L (ref 23–29)
CREAT SERPL-MCNC: 0.8 MG/DL (ref 0.5–1.4)
EST. GFR  (AFRICAN AMERICAN): >60 ML/MIN/1.73 M^2
EST. GFR  (NON AFRICAN AMERICAN): >60 ML/MIN/1.73 M^2
GLUCOSE SERPL-MCNC: 89 MG/DL (ref 70–110)
HDLC SERPL-MCNC: 107 MG/DL (ref 40–75)
HDLC SERPL-MCNC: 107 MG/DL (ref 40–75)
HDLC SERPL: 43.9 % (ref 20–50)
HDLC SERPL: 43.9 % (ref 20–50)
LDLC SERPL CALC-MCNC: 113.6 MG/DL (ref 63–159)
LDLC SERPL CALC-MCNC: 113.6 MG/DL (ref 63–159)
NONHDLC SERPL-MCNC: 137 MG/DL
NONHDLC SERPL-MCNC: 137 MG/DL
POTASSIUM SERPL-SCNC: 3.9 MMOL/L (ref 3.5–5.1)
PROT SERPL-MCNC: 7.4 G/DL (ref 6–8.4)
SODIUM SERPL-SCNC: 138 MMOL/L (ref 136–145)
TRIGL SERPL-MCNC: 117 MG/DL (ref 30–150)
TRIGL SERPL-MCNC: 117 MG/DL (ref 30–150)
TSH SERPL DL<=0.005 MIU/L-ACNC: 2.11 UIU/ML (ref 0.4–4)
VIT B12 SERPL-MCNC: 480 PG/ML (ref 210–950)

## 2019-07-24 ENCOUNTER — TELEPHONE (OUTPATIENT)
Dept: FAMILY MEDICINE | Facility: CLINIC | Age: 49
End: 2019-07-24

## 2019-07-24 NOTE — TELEPHONE ENCOUNTER
----- Message from Lindsay Begum sent at 7/24/2019  8:34 AM CDT -----   Type:  Patient Returning Call    Who Called:  pt  Who Left Message for Patient:  nurse  Does the patient know what this is regarding?:    Test  results  Best Call Back Number:[phone]  Additional Information:  Placed a call to pod

## 2019-07-28 RX ORDER — ACYCLOVIR 800 MG/1
800 TABLET ORAL 2 TIMES DAILY
Qty: 10 TABLET | Refills: 3 | Status: SHIPPED | OUTPATIENT
Start: 2019-07-28 | End: 2020-01-07 | Stop reason: SDUPTHER

## 2019-07-30 RX ORDER — ESCITALOPRAM OXALATE 10 MG/1
TABLET ORAL
Qty: 90 TABLET | Refills: 0 | Status: SHIPPED | OUTPATIENT
Start: 2019-07-30 | End: 2019-11-01 | Stop reason: SDUPTHER

## 2019-09-04 ENCOUNTER — LAB VISIT (OUTPATIENT)
Dept: LAB | Facility: HOSPITAL | Age: 49
End: 2019-09-04
Attending: FAMILY MEDICINE
Payer: COMMERCIAL

## 2019-09-04 DIAGNOSIS — E83.52 SERUM CALCIUM ELEVATED: ICD-10-CM

## 2019-09-04 LAB
ALBUMIN SERPL BCP-MCNC: 4.7 G/DL (ref 3.5–5.2)
ALP SERPL-CCNC: 77 U/L (ref 55–135)
ALT SERPL W/O P-5'-P-CCNC: 17 U/L (ref 10–44)
ANION GAP SERPL CALC-SCNC: 8 MMOL/L (ref 8–16)
AST SERPL-CCNC: 22 U/L (ref 10–40)
BILIRUB SERPL-MCNC: 0.3 MG/DL (ref 0.1–1)
BUN SERPL-MCNC: 11 MG/DL (ref 6–20)
CA-I BLDV-SCNC: 1.23 MMOL/L (ref 1.06–1.42)
CALCIUM SERPL-MCNC: 9.6 MG/DL (ref 8.7–10.5)
CHLORIDE SERPL-SCNC: 103 MMOL/L (ref 95–110)
CO2 SERPL-SCNC: 29 MMOL/L (ref 23–29)
CREAT SERPL-MCNC: 0.9 MG/DL (ref 0.5–1.4)
EST. GFR  (AFRICAN AMERICAN): >60 ML/MIN/1.73 M^2
EST. GFR  (NON AFRICAN AMERICAN): >60 ML/MIN/1.73 M^2
GLUCOSE SERPL-MCNC: 84 MG/DL (ref 70–110)
POTASSIUM SERPL-SCNC: 4 MMOL/L (ref 3.5–5.1)
PROT SERPL-MCNC: 7.9 G/DL (ref 6–8.4)
SODIUM SERPL-SCNC: 140 MMOL/L (ref 136–145)

## 2019-09-04 PROCEDURE — 36415 COLL VENOUS BLD VENIPUNCTURE: CPT | Mod: PN

## 2019-09-04 PROCEDURE — 82330 ASSAY OF CALCIUM: CPT

## 2019-09-04 PROCEDURE — 80053 COMPREHEN METABOLIC PANEL: CPT

## 2019-09-04 PROCEDURE — 83970 ASSAY OF PARATHORMONE: CPT

## 2019-09-05 LAB — PTH-INTACT SERPL-MCNC: 61 PG/ML (ref 9–77)

## 2019-10-19 DIAGNOSIS — Z71.6 ENCOUNTER FOR TOBACCO USE CESSATION COUNSELING: ICD-10-CM

## 2019-10-21 RX ORDER — BUPROPION HYDROCHLORIDE 150 MG/1
TABLET ORAL
Qty: 90 TABLET | Refills: 1 | Status: SHIPPED | OUTPATIENT
Start: 2019-10-21 | End: 2020-04-25

## 2019-10-31 RX ORDER — ESCITALOPRAM OXALATE 10 MG/1
TABLET ORAL
Qty: 90 TABLET | Refills: 0 | Status: CANCELLED | OUTPATIENT
Start: 2019-10-31

## 2019-11-01 RX ORDER — ESCITALOPRAM OXALATE 10 MG/1
TABLET ORAL
Qty: 90 TABLET | Refills: 3 | Status: SHIPPED | OUTPATIENT
Start: 2019-11-01 | End: 2020-11-12 | Stop reason: SDUPTHER

## 2019-11-01 NOTE — TELEPHONE ENCOUNTER
----- Message from Hermann Pierre sent at 11/1/2019  8:20 AM CDT -----  Contact: [facility]/Karissa Hancock called in and stated patient is requesting a refill on her Rx for escitalopram oxalate (LEXAPRO) 10 MG tablet, TAKE 1 TABLET(10 MG) BY MOUTH EVERY DAY,90 tablet 0 7/30/2019       [id number] [place] [address] AT [facility] & [address] [address]  Phone: [phone] Fax: [phone]

## 2020-01-07 RX ORDER — ACYCLOVIR 800 MG/1
800 TABLET ORAL 2 TIMES DAILY
Qty: 30 TABLET | Refills: 3 | Status: SHIPPED | OUTPATIENT
Start: 2020-01-07 | End: 2021-08-04 | Stop reason: ALTCHOICE

## 2020-04-25 DIAGNOSIS — Z71.6 ENCOUNTER FOR TOBACCO USE CESSATION COUNSELING: ICD-10-CM

## 2020-04-25 RX ORDER — BUPROPION HYDROCHLORIDE 150 MG/1
TABLET ORAL
Qty: 90 TABLET | Refills: 1 | Status: SHIPPED | OUTPATIENT
Start: 2020-04-25 | End: 2023-10-12

## 2020-06-18 ENCOUNTER — CLINICAL SUPPORT (OUTPATIENT)
Dept: URGENT CARE | Facility: CLINIC | Age: 50
End: 2020-06-18
Payer: COMMERCIAL

## 2020-06-18 PROCEDURE — U0003 INFECTIOUS AGENT DETECTION BY NUCLEIC ACID (DNA OR RNA); SEVERE ACUTE RESPIRATORY SYNDROME CORONAVIRUS 2 (SARS-COV-2) (CORONAVIRUS DISEASE [COVID-19]), AMPLIFIED PROBE TECHNIQUE, MAKING USE OF HIGH THROUGHPUT TECHNOLOGIES AS DESCRIBED BY CMS-2020-01-R: HCPCS

## 2020-06-19 LAB — SARS-COV-2 RNA RESP QL NAA+PROBE: NOT DETECTED

## 2020-10-05 ENCOUNTER — PATIENT MESSAGE (OUTPATIENT)
Dept: ADMINISTRATIVE | Facility: HOSPITAL | Age: 50
End: 2020-10-05

## 2021-01-04 ENCOUNTER — PATIENT MESSAGE (OUTPATIENT)
Dept: ADMINISTRATIVE | Facility: HOSPITAL | Age: 51
End: 2021-01-04

## 2021-02-11 RX ORDER — ESCITALOPRAM OXALATE 10 MG/1
20 TABLET ORAL DAILY
Qty: 180 TABLET | Refills: 2 | Status: SHIPPED | OUTPATIENT
Start: 2021-02-11 | End: 2022-03-14

## 2021-04-06 ENCOUNTER — PATIENT MESSAGE (OUTPATIENT)
Dept: ADMINISTRATIVE | Facility: HOSPITAL | Age: 51
End: 2021-04-06

## 2021-07-07 ENCOUNTER — PATIENT MESSAGE (OUTPATIENT)
Dept: ADMINISTRATIVE | Facility: HOSPITAL | Age: 51
End: 2021-07-07

## 2021-07-22 ENCOUNTER — TELEPHONE (OUTPATIENT)
Dept: FAMILY MEDICINE | Facility: CLINIC | Age: 51
End: 2021-07-22

## 2021-07-22 DIAGNOSIS — Z00.00 ROUTINE HEALTH MAINTENANCE: Primary | ICD-10-CM

## 2021-08-04 ENCOUNTER — OFFICE VISIT (OUTPATIENT)
Dept: FAMILY MEDICINE | Facility: CLINIC | Age: 51
End: 2021-08-04
Payer: COMMERCIAL

## 2021-08-04 DIAGNOSIS — L29.9 PRURITUS OF SKIN: ICD-10-CM

## 2021-08-04 DIAGNOSIS — B00.1 HERPES LABIALIS: Primary | ICD-10-CM

## 2021-08-04 PROCEDURE — 1159F PR MEDICATION LIST DOCUMENTED IN MEDICAL RECORD: ICD-10-PCS | Mod: CPTII,,, | Performed by: FAMILY MEDICINE

## 2021-08-04 PROCEDURE — 99213 OFFICE O/P EST LOW 20 MIN: CPT | Mod: 95,,, | Performed by: FAMILY MEDICINE

## 2021-08-04 PROCEDURE — 1159F MED LIST DOCD IN RCRD: CPT | Mod: CPTII,,, | Performed by: FAMILY MEDICINE

## 2021-08-04 PROCEDURE — 99213 PR OFFICE/OUTPT VISIT, EST, LEVL III, 20-29 MIN: ICD-10-PCS | Mod: 95,,, | Performed by: FAMILY MEDICINE

## 2021-08-04 PROCEDURE — 1160F RVW MEDS BY RX/DR IN RCRD: CPT | Mod: CPTII,,, | Performed by: FAMILY MEDICINE

## 2021-08-04 PROCEDURE — 1160F PR REVIEW ALL MEDS BY PRESCRIBER/CLIN PHARMACIST DOCUMENTED: ICD-10-PCS | Mod: CPTII,,, | Performed by: FAMILY MEDICINE

## 2021-08-04 RX ORDER — VALACYCLOVIR HYDROCHLORIDE 1 G/1
2000 TABLET, FILM COATED ORAL 2 TIMES DAILY
Qty: 4 TABLET | Refills: 0 | Status: SHIPPED | OUTPATIENT
Start: 2021-08-04 | End: 2022-03-17

## 2021-08-04 RX ORDER — METHYLPREDNISOLONE 4 MG/1
TABLET ORAL
Qty: 1 PACKAGE | Refills: 0 | Status: SHIPPED | OUTPATIENT
Start: 2021-08-04 | End: 2021-08-25

## 2022-03-14 ENCOUNTER — PATIENT MESSAGE (OUTPATIENT)
Dept: FAMILY MEDICINE | Facility: CLINIC | Age: 52
End: 2022-03-14
Payer: COMMERCIAL

## 2022-03-16 DIAGNOSIS — B00.1 HERPES LABIALIS: ICD-10-CM

## 2022-03-17 RX ORDER — VALACYCLOVIR HYDROCHLORIDE 1 G/1
TABLET, FILM COATED ORAL
Qty: 30 TABLET | Refills: 1 | Status: SHIPPED | OUTPATIENT
Start: 2022-03-17 | End: 2022-05-31 | Stop reason: SDUPTHER

## 2022-03-17 NOTE — TELEPHONE ENCOUNTER
This Rx Request does not qualify for assessment with the [facility]   Please review protocol details and the Care Due Message for extra clinical information    Reasons Rx Request may be deferred:  Labs/Vitals overdue  Pt due for OV with PCP    Note composed:8:47 PM 03/16/2022

## 2022-03-17 NOTE — TELEPHONE ENCOUNTER
No new care gaps identified.  Powered by [employer]. Reference number: [id number].   3/16/2022 8:20:56 PM CDT

## 2022-04-14 PROBLEM — M19.041 PRIMARY OSTEOARTHRITIS OF RIGHT HAND: Status: ACTIVE | Noted: 2022-04-14

## 2022-05-31 DIAGNOSIS — B00.1 HERPES LABIALIS: ICD-10-CM

## 2022-05-31 RX ORDER — VALACYCLOVIR HYDROCHLORIDE 1 G/1
1000 TABLET, FILM COATED ORAL DAILY
Qty: 90 TABLET | Refills: 3 | Status: SHIPPED | OUTPATIENT
Start: 2022-05-31

## 2022-07-20 RX ORDER — DIPHENOXYLATE HYDROCHLORIDE AND ATROPINE SULFATE 2.5; .025 MG/1; MG/1
1 TABLET ORAL 4 TIMES DAILY PRN
Qty: 20 TABLET | Refills: 0 | Status: SHIPPED | OUTPATIENT
Start: 2022-07-20 | End: 2022-07-30

## 2022-12-20 LAB
HUMAN PAPILLOMAVIRUS (HPV): NORMAL
PAP RECOMMENDATION EXT: NORMAL
PAP SMEAR: NORMAL

## 2023-09-08 ENCOUNTER — TELEPHONE (OUTPATIENT)
Dept: FAMILY MEDICINE | Facility: CLINIC | Age: 53
End: 2023-09-08
Payer: COMMERCIAL

## 2023-09-08 DIAGNOSIS — R05.9 COUGH, UNSPECIFIED TYPE: Primary | ICD-10-CM

## 2023-09-08 DIAGNOSIS — Z87.891 HISTORY OF TOBACCO USE: ICD-10-CM

## 2023-09-12 ENCOUNTER — PATIENT MESSAGE (OUTPATIENT)
Dept: FAMILY MEDICINE | Facility: CLINIC | Age: 53
End: 2023-09-12
Payer: COMMERCIAL

## 2023-10-12 ENCOUNTER — OFFICE VISIT (OUTPATIENT)
Dept: FAMILY MEDICINE | Facility: CLINIC | Age: 53
End: 2023-10-12
Payer: COMMERCIAL

## 2023-10-12 VITALS
DIASTOLIC BLOOD PRESSURE: 60 MMHG | OXYGEN SATURATION: 98 % | WEIGHT: 128.75 LBS | SYSTOLIC BLOOD PRESSURE: 108 MMHG | HEIGHT: 66 IN | HEART RATE: 78 BPM | BODY MASS INDEX: 20.69 KG/M2

## 2023-10-12 DIAGNOSIS — F41.9 ANXIETY: ICD-10-CM

## 2023-10-12 DIAGNOSIS — Z12.31 SCREENING MAMMOGRAM FOR HIGH-RISK PATIENT: ICD-10-CM

## 2023-10-12 DIAGNOSIS — R92.8 ABNORMAL MAMMOGRAM: ICD-10-CM

## 2023-10-12 DIAGNOSIS — R07.9 CHEST PAIN, UNSPECIFIED TYPE: Primary | ICD-10-CM

## 2023-10-12 PROCEDURE — 1159F MED LIST DOCD IN RCRD: CPT | Mod: CPTII,S$GLB,, | Performed by: FAMILY MEDICINE

## 2023-10-12 PROCEDURE — 3074F PR MOST RECENT SYSTOLIC BLOOD PRESSURE < 130 MM HG: ICD-10-PCS | Mod: CPTII,S$GLB,, | Performed by: FAMILY MEDICINE

## 2023-10-12 PROCEDURE — 93005 EKG 12-LEAD: ICD-10-PCS | Mod: S$GLB,,, | Performed by: FAMILY MEDICINE

## 2023-10-12 PROCEDURE — 3078F PR MOST RECENT DIASTOLIC BLOOD PRESSURE < 80 MM HG: ICD-10-PCS | Mod: CPTII,S$GLB,, | Performed by: FAMILY MEDICINE

## 2023-10-12 PROCEDURE — 99999 PR PBB SHADOW E&M-EST. PATIENT-LVL IV: CPT | Mod: PBBFAC,,, | Performed by: FAMILY MEDICINE

## 2023-10-12 PROCEDURE — 1160F RVW MEDS BY RX/DR IN RCRD: CPT | Mod: CPTII,S$GLB,, | Performed by: FAMILY MEDICINE

## 2023-10-12 PROCEDURE — 99999 PR PBB SHADOW E&M-EST. PATIENT-LVL IV: ICD-10-PCS | Mod: PBBFAC,,, | Performed by: FAMILY MEDICINE

## 2023-10-12 PROCEDURE — 93010 EKG 12-LEAD: ICD-10-PCS | Mod: S$GLB,,, | Performed by: INTERNAL MEDICINE

## 2023-10-12 PROCEDURE — 93005 ELECTROCARDIOGRAM TRACING: CPT | Mod: S$GLB,,, | Performed by: FAMILY MEDICINE

## 2023-10-12 PROCEDURE — 99214 PR OFFICE/OUTPT VISIT, EST, LEVL IV, 30-39 MIN: ICD-10-PCS | Mod: S$GLB,,, | Performed by: FAMILY MEDICINE

## 2023-10-12 PROCEDURE — 93010 ELECTROCARDIOGRAM REPORT: CPT | Mod: S$GLB,,, | Performed by: INTERNAL MEDICINE

## 2023-10-12 PROCEDURE — 3008F PR BODY MASS INDEX (BMI) DOCUMENTED: ICD-10-PCS | Mod: CPTII,S$GLB,, | Performed by: FAMILY MEDICINE

## 2023-10-12 PROCEDURE — 1159F PR MEDICATION LIST DOCUMENTED IN MEDICAL RECORD: ICD-10-PCS | Mod: CPTII,S$GLB,, | Performed by: FAMILY MEDICINE

## 2023-10-12 PROCEDURE — 3074F SYST BP LT 130 MM HG: CPT | Mod: CPTII,S$GLB,, | Performed by: FAMILY MEDICINE

## 2023-10-12 PROCEDURE — 99214 OFFICE O/P EST MOD 30 MIN: CPT | Mod: S$GLB,,, | Performed by: FAMILY MEDICINE

## 2023-10-12 PROCEDURE — 3008F BODY MASS INDEX DOCD: CPT | Mod: CPTII,S$GLB,, | Performed by: FAMILY MEDICINE

## 2023-10-12 PROCEDURE — 1160F PR REVIEW ALL MEDS BY PRESCRIBER/CLIN PHARMACIST DOCUMENTED: ICD-10-PCS | Mod: CPTII,S$GLB,, | Performed by: FAMILY MEDICINE

## 2023-10-12 PROCEDURE — 3078F DIAST BP <80 MM HG: CPT | Mod: CPTII,S$GLB,, | Performed by: FAMILY MEDICINE

## 2023-10-12 RX ORDER — FAMOTIDINE 40 MG/1
40 TABLET, FILM COATED ORAL 2 TIMES DAILY
Qty: 60 TABLET | Refills: 11 | Status: SHIPPED | OUTPATIENT
Start: 2023-10-12 | End: 2024-10-11

## 2023-10-12 RX ORDER — DULOXETIN HYDROCHLORIDE 30 MG/1
30 CAPSULE, DELAYED RELEASE ORAL
COMMUNITY
Start: 2023-07-28

## 2023-10-12 RX ORDER — PROGESTERONE 200 MG/1
200 CAPSULE ORAL
COMMUNITY
Start: 2023-09-25

## 2023-10-12 RX ORDER — SEMAGLUTIDE 0.68 MG/ML
0.5 INJECTION, SOLUTION SUBCUTANEOUS
COMMUNITY
Start: 2023-09-12

## 2023-10-12 NOTE — PROGRESS NOTES
"Subjective:       Patient ID: Adamaris Vanegas is a 53 y.o. female.    Chief Complaint: Follow-up (Tightness in chest noticed Monday )    Follow-up  Associated symptoms include chest pain. Pertinent negatives include no arthralgias, coughing, fatigue, headaches or nausea.     Chest tightness started on Monday this week. Mid-sternal, maybe a tightness, but not severe. No interference with activities or exercise (pilates). Usually more noticeable mid-day.   Coffee AM, nuts 1030 and then lunch at 1pm.   No change to activities or regimen, but she was on a girls' weekend this past and had more wine than she would usually.     Review of Systems:  Review of Systems   Constitutional:  Negative for fatigue and unexpected weight change.   HENT:  Negative for postnasal drip and rhinorrhea.    Respiratory:  Negative for cough and shortness of breath.    Cardiovascular:  Positive for chest pain.   Gastrointestinal:  Negative for constipation, diarrhea and nausea.   Genitourinary:  Negative for difficulty urinating and dysuria.   Musculoskeletal:  Negative for arthralgias and back pain.   Neurological:  Negative for dizziness and headaches.       Objective:     Vitals:    10/12/23 1537   BP: 108/60   Pulse: 78   SpO2: 98%   Weight: 58.4 kg (128 lb 12 oz)   Height: 5' 6" (1.676 m)          Physical Exam  Vitals and nursing note reviewed.   Constitutional:       General: She is not in acute distress.     Appearance: Normal appearance. She is well-developed.   HENT:      Head: Normocephalic and atraumatic.   Eyes:      General: No scleral icterus.        Right eye: No discharge.         Left eye: No discharge.      Conjunctiva/sclera: Conjunctivae normal.   Cardiovascular:      Rate and Rhythm: Normal rate and regular rhythm.   Pulmonary:      Effort: Pulmonary effort is normal. No respiratory distress.      Breath sounds: Normal breath sounds.   Abdominal:      Palpations: Abdomen is soft.      Tenderness: There is no guarding. "   Musculoskeletal:         General: No deformity or signs of injury.      Cervical back: Normal range of motion and neck supple.   Lymphadenopathy:      Cervical: No cervical adenopathy.   Skin:     General: Skin is warm and dry.      Findings: No rash.   Neurological:      General: No focal deficit present.      Mental Status: She is alert and oriented to person, place, and time.   Psychiatric:         Mood and Affect: Mood normal.         Behavior: Behavior normal.           Assessment & Plan:  Chest pain, unspecified type  Comments:  consider GI given limited sx without exertional component  Orders:  -     famotidine (PEPCID) 40 MG tablet; Take 1 tablet (40 mg total) by mouth 2 (two) times daily.  Dispense: 60 tablet; Refill: 11  -     EKG 12-lead; Future    Screening mammogram for high-risk patient  -     Cancel: Mammo Digital Screening Bilat; Future; Expected date: 10/12/2023  -     Mammo Digital Screening Bilat w/ Cj; Future; Expected date: 10/12/2023    Anxiety  Comments:  relatively stable, cont cymbalta

## 2023-11-06 ENCOUNTER — HOSPITAL ENCOUNTER (OUTPATIENT)
Dept: RADIOLOGY | Facility: HOSPITAL | Age: 53
Discharge: HOME | End: 2023-11-06
Attending: FAMILY MEDICINE
Payer: COMMERCIAL

## 2023-11-06 DIAGNOSIS — Z87.891 HISTORY OF TOBACCO USE: ICD-10-CM

## 2023-11-06 DIAGNOSIS — R05.9 COUGH, UNSPECIFIED TYPE: ICD-10-CM

## 2023-11-06 PROCEDURE — 71250 CT THORAX DX C-: CPT | Mod: TC,PO

## 2023-11-06 PROCEDURE — 71250 CT CHEST WITHOUT CONTRAST: ICD-10-PCS | Mod: 26,,, | Performed by: RADIOLOGY

## 2023-11-06 PROCEDURE — 71250 CT THORAX DX C-: CPT | Mod: 26,,, | Performed by: RADIOLOGY

## 2024-09-03 RX ORDER — VARENICLINE TARTRATE 0.5 (11)-1
KIT ORAL
Qty: 1 EACH | Refills: 0 | Status: SHIPPED | OUTPATIENT
Start: 2024-09-03 | End: 2024-09-30

## 2024-09-30 RX ORDER — VARENICLINE TARTRATE 1 MG/1
1 TABLET, FILM COATED ORAL 2 TIMES DAILY
Qty: 180 TABLET | Refills: 1 | Status: SHIPPED | OUTPATIENT
Start: 2024-09-30

## 2024-10-22 ENCOUNTER — TELEPHONE (OUTPATIENT)
Dept: FAMILY MEDICINE | Facility: CLINIC | Age: 54
End: 2024-10-22
Payer: COMMERCIAL

## 2024-10-22 NOTE — TELEPHONE ENCOUNTER
----- Message from Krys sent at 10/22/2024  4:34 PM CDT -----  Type: Needs Medical Advice  Who Called:  pt  Pharmacy name and phone #:    Best Call Back Number: [phone]  Additional Information: pt is calling the office requesting a refferal for dr puri for varicose veins . Please call pt back to advise thanks

## 2024-11-19 DIAGNOSIS — M79.606 PAIN AND SWELLING OF LOWER EXTREMITY, UNSPECIFIED LATERALITY: Primary | ICD-10-CM

## 2024-11-19 DIAGNOSIS — M79.89 PAIN AND SWELLING OF LOWER EXTREMITY, UNSPECIFIED LATERALITY: Primary | ICD-10-CM

## 2024-11-26 ENCOUNTER — HOSPITAL ENCOUNTER (OUTPATIENT)
Dept: RADIOLOGY | Facility: HOSPITAL | Age: 54
Discharge: HOME | End: 2024-11-26
Attending: SURGERY
Payer: COMMERCIAL

## 2024-11-26 DIAGNOSIS — M79.89 PAIN AND SWELLING OF LOWER EXTREMITY, UNSPECIFIED LATERALITY: ICD-10-CM

## 2024-11-26 DIAGNOSIS — M79.606 PAIN AND SWELLING OF LOWER EXTREMITY, UNSPECIFIED LATERALITY: ICD-10-CM

## 2024-11-26 PROCEDURE — 93970 EXTREMITY STUDY: CPT | Mod: 26,,, | Performed by: STUDENT IN AN ORGANIZED HEALTH CARE EDUCATION/TRAINING PROGRAM

## 2024-11-26 PROCEDURE — 93970 EXTREMITY STUDY: CPT | Mod: TC,PO

## 2024-11-29 RX ORDER — IVERMECTIN 3 MG/1
3 TABLET ORAL ONCE
Qty: 1 TABLET | Refills: 0 | Status: SHIPPED | OUTPATIENT
Start: 2024-11-29 | End: 2024-11-29

## 2024-11-29 RX ORDER — HYDROXYZINE HYDROCHLORIDE 25 MG/1
25 TABLET, FILM COATED ORAL 3 TIMES DAILY PRN
Qty: 30 TABLET | Refills: 2 | Status: SHIPPED | OUTPATIENT
Start: 2024-11-29

## 2024-11-29 RX ORDER — PERMETHRIN 50 MG/G
CREAM TOPICAL ONCE
Qty: 60 G | Refills: 1 | Status: SHIPPED | OUTPATIENT
Start: 2024-11-29 | End: 2024-11-29

## 2025-03-25 ENCOUNTER — TELEPHONE (OUTPATIENT)
Dept: FAMILY MEDICINE | Facility: CLINIC | Age: 55
End: 2025-03-25
Payer: COMMERCIAL

## 2025-03-25 NOTE — TELEPHONE ENCOUNTER
Tried to reach pt. No answer. Left message for pt to call back. Will find out more info when she calls back

## 2025-03-25 NOTE — TELEPHONE ENCOUNTER
----- Message from Velma sent at 3/24/2025  4:13 PM CDT -----  Name of Who is Calling:NEHEMIAH ULLOA [[id number]]  What is the request in detail:Pt requesting a call back from office today if possible.  Can the clinic reply by [facility]:Call  What Number to Call Back if not in [facility]:Telephone Information:[email or web address]          [phone]

## 2025-05-27 ENCOUNTER — OFFICE VISIT (OUTPATIENT)
Dept: FAMILY MEDICINE | Facility: CLINIC | Age: 55
End: 2025-05-27
Payer: COMMERCIAL

## 2025-05-27 ENCOUNTER — PATIENT OUTREACH (OUTPATIENT)
Dept: ADMINISTRATIVE | Facility: HOSPITAL | Age: 55
End: 2025-05-27
Payer: COMMERCIAL

## 2025-05-27 VITALS
SYSTOLIC BLOOD PRESSURE: 110 MMHG | HEIGHT: 66 IN | DIASTOLIC BLOOD PRESSURE: 64 MMHG | OXYGEN SATURATION: 98 % | HEART RATE: 76 BPM | WEIGHT: 138.44 LBS | BODY MASS INDEX: 22.25 KG/M2

## 2025-05-27 DIAGNOSIS — E61.1 IRON DEFICIENCY: ICD-10-CM

## 2025-05-27 DIAGNOSIS — Z00.00 ROUTINE GENERAL MEDICAL EXAMINATION AT A HEALTH CARE FACILITY: Primary | ICD-10-CM

## 2025-05-27 DIAGNOSIS — K21.9 GASTROESOPHAGEAL REFLUX DISEASE, UNSPECIFIED WHETHER ESOPHAGITIS PRESENT: ICD-10-CM

## 2025-05-27 DIAGNOSIS — R91.1 SOLITARY PULMONARY NODULE: ICD-10-CM

## 2025-05-27 DIAGNOSIS — B00.1 HERPES LABIALIS: ICD-10-CM

## 2025-05-27 PROCEDURE — 1160F RVW MEDS BY RX/DR IN RCRD: CPT | Mod: CPTII,S$GLB,, | Performed by: FAMILY MEDICINE

## 2025-05-27 PROCEDURE — G0296 VISIT TO DETERM LDCT ELIG: HCPCS | Mod: S$GLB,,, | Performed by: FAMILY MEDICINE

## 2025-05-27 PROCEDURE — 3074F SYST BP LT 130 MM HG: CPT | Mod: CPTII,S$GLB,, | Performed by: FAMILY MEDICINE

## 2025-05-27 PROCEDURE — 3078F DIAST BP <80 MM HG: CPT | Mod: CPTII,S$GLB,, | Performed by: FAMILY MEDICINE

## 2025-05-27 PROCEDURE — 3008F BODY MASS INDEX DOCD: CPT | Mod: CPTII,S$GLB,, | Performed by: FAMILY MEDICINE

## 2025-05-27 PROCEDURE — 99396 PREV VISIT EST AGE 40-64: CPT | Mod: S$GLB,,, | Performed by: FAMILY MEDICINE

## 2025-05-27 PROCEDURE — 1159F MED LIST DOCD IN RCRD: CPT | Mod: CPTII,S$GLB,, | Performed by: FAMILY MEDICINE

## 2025-05-27 PROCEDURE — 99999 PR PBB SHADOW E&M-EST. PATIENT-LVL IV: CPT | Mod: PBBFAC,,, | Performed by: FAMILY MEDICINE

## 2025-05-27 RX ORDER — LANOLIN ALCOHOL/MO/W.PET/CERES
1 CREAM (GRAM) TOPICAL
COMMUNITY

## 2025-05-27 RX ORDER — FAMOTIDINE 40 MG/1
40 TABLET, FILM COATED ORAL 2 TIMES DAILY PRN
Qty: 180 TABLET | Refills: 3 | Status: SHIPPED | OUTPATIENT
Start: 2025-05-27

## 2025-05-27 RX ORDER — VALACYCLOVIR HYDROCHLORIDE 1 G/1
1000 TABLET, FILM COATED ORAL DAILY
Qty: 90 TABLET | Refills: 3 | Status: SHIPPED | OUTPATIENT
Start: 2025-05-27

## 2025-05-27 NOTE — PROGRESS NOTES
Chief Complaint:  Chief Complaint   Patient presents with    Annual Exam        HPI:  Adamaris is a 55 y.o. year old     History of Present Illness    CHIEF COMPLAINT:  Adamaris presents today for follow up.    MEDICATIONS:  She takes Cymbalta 30mg, Valtrex as needed for fever blisters, Lysine daily, Pepcid almost daily, and Ferrous sulfate for low iron. She receives hormone therapy via estrogen and testosterone pellets.    GI AND DIETARY CONCERNS:  Her previous chest pain has resolved with Pepcid. She notes decreased tolerance to coffee and alcohol, particularly wine or beer on weekends, though drinking less alcohol than previously.    MEDICAL HISTORY:  CT chest in November 2023 showed small lung nodules, likely inflammatory in nature. She has a history of scabies. She recently underwent a vein procedure involving removal of one varicose vein and rerouting of several others.    SOCIAL AND EXERCISE HISTORY:  She has a 20-year smoking history and quit on September 24th. She attends [employer] classes twice weekly and expresses desire to begin daily walking but currently does not engage in any walking exercise.      ROS:  Cardiovascular: no chest pain, no lower extremity edema  Respiratory: no cough  Gastrointestinal: +heartburn, +indigestion  Musculoskeletal: +limb pain  Neurological: no headache, no dizziness           Review of Systems   Constitutional:  Negative for fatigue, fever and unexpected weight change.   HENT:  Negative for congestion, postnasal drip and rhinorrhea.    Respiratory:  Negative for cough and shortness of breath.    Cardiovascular:  Negative for chest pain, palpitations and leg swelling.        Saw vasc/jhon for varicose veins, improved.   Gastrointestinal:  Negative for constipation, diarrhea and nausea.   Genitourinary:  Negative for difficulty urinating and dysuria.   Musculoskeletal:  Negative for arthralgias and back pain.        Exercise: pilates, planning to start walking   Neurological:   "Negative for dizziness, light-headedness and headaches.   Psychiatric/Behavioral:  Negative for dysphoric mood and sleep disturbance.          Past Medical History:  Past Medical History:   Diagnosis Date    Anxiety          Vitals:  Vitals:    05/27/25 1324   BP: 110/64   Pulse: 76   SpO2: 98%   Weight: 62.8 kg (138 lb 7.2 oz)   Height: 5' 6" (1.676 m)   PainSc: 0-No pain       BP Readings from Last 5 Encounters:   05/27/25 110/64   10/12/23 108/60   04/14/22 112/62   07/22/19 112/76   12/21/17 102/66       The ASCVD Risk score (Elyse MCGARRY, et al., 2019) failed to calculate for the following reasons:    Cannot find a previous HDL lab    Cannot find a previous total cholesterol lab      Physical Exam:  Physical Exam  Vitals and nursing note reviewed.   Constitutional:       General: She is not in acute distress.     Appearance: Normal appearance. She is well-developed.   HENT:      Head: Normocephalic and atraumatic.   Eyes:      General: No scleral icterus.        Right eye: No discharge.         Left eye: No discharge.      Conjunctiva/sclera: Conjunctivae normal.   Cardiovascular:      Rate and Rhythm: Normal rate.   Pulmonary:      Effort: Pulmonary effort is normal. No respiratory distress.   Abdominal:      Palpations: Abdomen is soft.      Tenderness: There is no guarding.   Musculoskeletal:         General: No deformity or signs of injury.      Cervical back: Neck supple.   Skin:     General: Skin is warm and dry.      Findings: No rash.   Neurological:      General: No focal deficit present.      Mental Status: She is alert and oriented to person, place, and time.   Psychiatric:         Mood and Affect: Mood normal.         Behavior: Behavior normal.             Assessment & Plan:  Routine general medical examination at a health care facility  Comments:  health maintenance reviewed    Herpes labialis  Comments:  valtrex prn, pt uses lysine for prevention and does not feel that daily suppression is needed at " this time  Orders:  -     valACYclovir (VALTREX) 1000 MG tablet; Take 1 tablet (1,000 mg total) by mouth once daily.  Dispense: 90 tablet; Refill: 3    Solitary pulmonary nodule  Comments:  update ldct  Orders:  -     CT Chest Low Dose Diagnostic; Future; Expected date: 05/27/2025    Iron deficiency  Comments:  on iron supplementation otc    Gastroesophageal reflux disease, unspecified whether esophagitis present  Comments:  ok to continue pepcid 40mg BID, may need to add ppi if breakthrough or persistent sx  Orders:  -     famotidine (PEPCID) 40 MG tablet; Take 1 tablet (40 mg total) by mouth 2 (two) times daily as needed for Heartburn.  Dispense: 180 tablet; Refill: 3         Assessment & Plan    ORDERS:   Ordered low-dose CT Chest for lung cancer screening due to smoking history and previous nodules, pending insurance approval and patient decision based on out-of-pocket costs.    IMPRESSION:  Evaluated current hormone therapy regimen, noting estrogen and testosterone pellets.  Assessed current antidepressant regimen, continuing Cymbalta 30 mg.  Considered options for weight management, including Zepbound (tirzepatide) and Metformin, but did not prescribe at this time.    PLAN SUMMARY:   Order low-dose CT Chest for lung cancer screening, pending insurance approval   Start Valtrex 1000 mg twice daily for fever blisters   Continue Pepcid 40 mg twice daily for acid reflux symptoms   Adamaris to receive call from billing regarding CT costs   Adamaris can message if interested in Zepbound/Mounjaro for weight management   Follow up in 1 year for annual visit, unless health changes occur sooner    PATIENT EDUCATION:   Educated on the pneumonia vaccine, explaining it is the same type given to infants and helps boost the immune system against certain strains of bacterial pneumonia.    ACTION ITEMS/LIFESTYLE:   Discussed potential lifestyle modifications for managing acid reflux, such as adjusting coffee and alcohol  consumption.    MEDICATIONS:   Continued Pepcid 40 mg twice daily for acid reflux symptoms, deemed appropriate and safe for long-term use and does not interfere with other medications.   Started Valtrex 1000 mg twice daily for fever blisters.    FOLLOW UP:   Adamaris will receive a call to schedule the CT if approved.   Adamaris will get a call from billing regarding out-of-pocket costs for the CT before scheduling.   Adamaris can message if interested in trying Zepbound/Mounjaro for weight management.   Follow up in 1 year for annual visit, unless health changes occur sooner.         This note was generated with the assistance of ambient listening technology. Verbal consent was obtained by the patient and accompanying visitor(s) for the recording of patient appointment to facilitate this note. I attest to having reviewed and edited the generated note for accuracy, though some syntax or spelling errors may persist. Please contact the author of this note for any clarification.      I reviewed with the patient the [place]. Preventative Services Task Force Recommendation on Lung Cancer Screening With Low-Dose Computed Tomography.  Patient meets eligibility criteria as per current CMS guidelines for initial LDCT lung cancer screening (age 50-77; asymptomatic; tobacco smoking hx of at least 20  pack years; current smoker or one who has quit smoking within the last 15 years).  Benefits and harms of screening discussed with patient, including follow-up diagnostic testing, over-diagnosis, false positive rate, and total radiation exposure.  Patient counseled on the importance of adherence to annual lung cancer LDCT screening, impact of comorbidities, and ability or willingness to undergo diagnosis and treatment.  Patient counseled on the importance of maintaining cigarette smoking abstinence if former smoker or the importance of smoking cessation if current smoker and, if appropriate, furnishing of information about tobacco cessation   interventions.  All questions were answered.  Patient wishes to proceed with initial/baseline testing.  Order has been placed.

## 2025-06-30 ENCOUNTER — OFFICE VISIT (OUTPATIENT)
Dept: GASTROENTEROLOGY | Facility: CLINIC | Age: 55
End: 2025-06-30
Payer: COMMERCIAL

## 2025-06-30 ENCOUNTER — LAB VISIT (OUTPATIENT)
Dept: LAB | Facility: HOSPITAL | Age: 55
End: 2025-06-30
Payer: COMMERCIAL

## 2025-06-30 VITALS — HEIGHT: 66 IN | BODY MASS INDEX: 22.25 KG/M2 | WEIGHT: 138.44 LBS

## 2025-06-30 DIAGNOSIS — Z12.11 SCREENING FOR COLON CANCER: ICD-10-CM

## 2025-06-30 DIAGNOSIS — R10.13 EPIGASTRIC PAIN: Primary | ICD-10-CM

## 2025-06-30 DIAGNOSIS — K21.9 GASTROESOPHAGEAL REFLUX DISEASE, UNSPECIFIED WHETHER ESOPHAGITIS PRESENT: ICD-10-CM

## 2025-06-30 DIAGNOSIS — R10.13 EPIGASTRIC PAIN: ICD-10-CM

## 2025-06-30 LAB — LIPASE SERPL-CCNC: 19 U/L (ref 4–60)

## 2025-06-30 PROCEDURE — 99999 PR PBB SHADOW E&M-EST. PATIENT-LVL IV: CPT | Mod: PBBFAC,,,

## 2025-06-30 PROCEDURE — 1159F MED LIST DOCD IN RCRD: CPT | Mod: CPTII,S$GLB,,

## 2025-06-30 PROCEDURE — 3008F BODY MASS INDEX DOCD: CPT | Mod: CPTII,S$GLB,,

## 2025-06-30 PROCEDURE — 36415 COLL VENOUS BLD VENIPUNCTURE: CPT | Mod: PO

## 2025-06-30 PROCEDURE — 99204 OFFICE O/P NEW MOD 45 MIN: CPT | Mod: S$GLB,,,

## 2025-06-30 PROCEDURE — 1160F RVW MEDS BY RX/DR IN RCRD: CPT | Mod: CPTII,S$GLB,,

## 2025-06-30 PROCEDURE — 83690 ASSAY OF LIPASE: CPT

## 2025-06-30 RX ORDER — SUCRALFATE 1 G/1
1 TABLET ORAL
Qty: 40 TABLET | Refills: 0 | Status: SHIPPED | OUTPATIENT
Start: 2025-06-30 | End: 2025-07-10

## 2025-06-30 RX ORDER — PANTOPRAZOLE SODIUM 40 MG/1
40 TABLET, DELAYED RELEASE ORAL DAILY
Qty: 90 TABLET | Refills: 0 | Status: SHIPPED | OUTPATIENT
Start: 2025-06-30 | End: 2025-09-28

## 2025-06-30 NOTE — PROGRESS NOTES
Subjective:       Patient ID: Adamaris Vanegas is a 55 y.o. female Body mass index is 22.35 kg/m².    Chief Complaint: Gastroesophageal Reflux and Abdominal Pain    This patient is new to me.     Gastroesophageal Reflux  She complains of abdominal pain (CC: Daily epigastric burning currently rated 1/10 that worsens after drinking alcohol; risk factor: Also takes ibuprofen p.r.n. a couple of times a month; pain can lasts hours). She reports no belching, no chest pain, no choking, no coughing, no dysphagia, no early satiety, no globus sensation, no heartburn, no hoarse voice, no nausea, no sore throat or no water brash. This is a chronic problem. The current episode started more than 1 month ago (Started about a year ago). The problem occurs frequently. The problem has been waxing and waning. The symptoms are aggravated by ETOH and caffeine (Drinks 2 cups of coffee daily and takes ibuprofen p.r.n. about twice monthly). Pertinent negatives include no anemia, fatigue, melena, muscle weakness or weight loss. Denies diarrhea or constipation  - typically has daily formed BMs; denies hematochezia.  She reports [facility] colonoscopy [facility] a couple years ago that was normal. Risk factors include ETOH use, NSAIDs and caffeine use. She has tried a histamine-2 antagonist (Currently taking famotidine 40 mg b.i.d., which has become ineffective) for the symptoms. The treatment provided mild relief. Past procedures do not include an abdominal ultrasound, an EGD, esophageal manometry, esophageal pH monitoring, H. pylori antibody titer or a UGI. Past invasive treatments do not include gastroplasty, gastroplication or reflux surgery.     Review of Systems   Constitutional:  Negative for activity change, appetite change, chills, diaphoresis, fatigue, fever, unexpected weight change and weight loss.   HENT:  Negative for hoarse voice, sore throat and trouble swallowing.    Respiratory:  Negative for cough, choking and  shortness of breath.    Cardiovascular:  Negative for chest pain.   Gastrointestinal:  Positive for abdominal pain (CC: Daily epigastric burning currently rated 1/10 that worsens after drinking alcohol; risk factor: Also takes ibuprofen p.r.n. a couple of times a month; pain can lasts hours). Negative for abdominal distention, anal bleeding, blood in stool, constipation, diarrhea, dysphagia, heartburn, melena, nausea, rectal pain and vomiting.   Musculoskeletal:  Negative for muscle weakness.       Patient's last menstrual period was 04/02/2018.  Past Medical History:   Diagnosis Date    Anxiety      Past Surgical History:   Procedure Laterality Date    COLONOSCOPY      1-2 years ago - normal per pt report    HAND FUSION Right 04/14/2022    Procedure: FUSION, JOINT, HAND -  Litlle Distal;  Surgeon: Evaristo Andino MD;  Location: [facility] OR;  Service: Orthopedics;  Laterality: Right;    WISDOM TOOTH EXTRACTION       Family History   Problem Relation Name Age of Onset    COPD Father       Social History[1]  Wt Readings from Last 10 Encounters:   06/30/25 62.8 kg (138 lb 7.2 oz)   05/27/25 62.8 kg (138 lb 7.2 oz)   11/26/24 59 kg (130 lb)   04/01/24 59 kg (130 lb)   10/18/23 58.1 kg (128 lb)   10/12/23 58.4 kg (128 lb 12 oz)   09/12/22 62.6 kg (138 lb)   06/10/22 62.6 kg (138 lb)   04/14/22 62.6 kg (138 lb)   07/22/19 68.9 kg (152 lb 0.1 oz)     Lab Results   Component Value Date    WBC 5.50 07/22/2019    HGB 13.2 07/22/2019    HCT 40.3 07/22/2019    MCV 96 07/22/2019     07/22/2019     CMP  Sodium   Date Value Ref Range Status   09/04/2019 140 136 - 145 mmol/L Final     Potassium   Date Value Ref Range Status   09/04/2019 4.0 3.5 - 5.1 mmol/L Final     Chloride   Date Value Ref Range Status   09/04/2019 103 95 - 110 mmol/L Final     CO2   Date Value Ref Range Status   09/04/2019 29 23 - 29 mmol/L Final     Glucose   Date Value Ref Range Status   09/04/2019 84 70 - 110 mg/dL Final     BUN   Date Value Ref Range  Status   09/04/2019 11 6 - 20 mg/dL Final     Creatinine   Date Value Ref Range Status   09/04/2019 0.9 0.5 - 1.4 mg/dL Final     Calcium   Date Value Ref Range Status   09/04/2019 9.6 8.7 - 10.5 mg/dL Final     Total Protein   Date Value Ref Range Status   09/04/2019 7.9 6.0 - 8.4 g/dL Final     Albumin   Date Value Ref Range Status   09/04/2019 4.7 3.5 - 5.2 g/dL Final     Total Bilirubin   Date Value Ref Range Status   09/04/2019 0.3 0.1 - 1.0 mg/dL Final     Comment:     For infants and newborns, interpretation of results should be based  on gestational age, weight and in agreement with clinical  observations.  Premature Infant recommended reference ranges:  Up to 24 hours.............<8.0 mg/dL  Up to 48 hours............<12.0 mg/dL  3-5 days..................<15.0 mg/dL  6-29 days.................<15.0 mg/dL       Alkaline Phosphatase   Date Value Ref Range Status   09/04/2019 77 55 - 135 U/L Final     AST   Date Value Ref Range Status   09/04/2019 22 10 - 40 U/L Final     ALT   Date Value Ref Range Status   09/04/2019 17 10 - 44 U/L Final     Anion Gap   Date Value Ref Range Status   09/04/2019 8 8 - 16 mmol/L Final     eGFR if    Date Value Ref Range Status   09/04/2019 >60.0 >60 mL/min/1.73 m^2 Final     eGFR if non    Date Value Ref Range Status   09/04/2019 >60.0 >60 mL/min/1.73 m^2 Final     Comment:     Calculation used to obtain the estimated glomerular filtration  rate (eGFR) is the CKD-EPI equation.        Lab Results   Component Value Date    TSH 2.109 07/22/2019     Reviewed prior medical records including radiology report of CT chest 11/06/2023 & endoscopy history (see surgical history).    Objective:      Physical Exam  Vitals and nursing note reviewed.   Constitutional:       General: She is not in acute distress.     Appearance: Normal appearance. She is normal weight. She is not ill-appearing.   HENT:      Mouth/Throat:      Lips: Pink. No lesions.    Cardiovascular:      Pulses: Normal pulses.      Heart sounds: Normal heart sounds.   Pulmonary:      Effort: Pulmonary effort is normal. No respiratory distress.      Breath sounds: Normal breath sounds.   Abdominal:      General: Bowel sounds are normal. There is no distension or abdominal bruit. There are no signs of injury.      Palpations: Abdomen is soft. There is no shifting dullness, fluid wave, hepatomegaly, splenomegaly or mass.      Tenderness: There is abdominal tenderness in the epigastric area. There is no guarding or rebound. Negative signs include Sanchez's sign, Rovsing's sign and McBurney's sign.   Skin:     General: Skin is warm and dry.      Coloration: Skin is not jaundiced or pale.   Neurological:      Mental Status: She is alert and oriented to person, place, and time.   Psychiatric:         Attention and Perception: Attention normal.         Mood and Affect: Mood normal.         Speech: Speech normal.         Behavior: Behavior normal.         Assessment:       1. Epigastric pain    2. Gastroesophageal reflux disease, unspecified whether esophagitis present    3. Screening for colon cancer        Plan:       Patient agreed to sign medical records release consent for us to obtain records from [facility] of most recent colonoscopy with patho and recent blood work from Dr. Jara performed at [facility].    Epigastric pain  - schedule EGD, discussed procedure with patient, including risks and benefits, patient verbalized understanding  - testing for H. Pylori typically performed during EGD  -Avoid known foods which trigger symptoms & to minimize/avoid high-fat foods, chocolate, caffeine, citrus, alcohol, & tomato products.  -Avoid/limit use of NSAID's, since they can cause GI upset, bleeding, and/or ulcers. If needed, take with food.  -     Lipase; Future; Expected date: 06/30/2025  -START: pantoprazole (PROTONIX) 40 MG tablet; Take 1 tablet (40 mg total) by mouth once daily.  Dispense: 90 tablet;  Refill: 0  - START: sucralfate (CARAFATE) 1 gram tablet; Take 1 tablet (1 g total) by mouth 4 (four) times daily before meals and nightly for 10 days  Dispense: 40 tablet; Refill: 0  - discontinue famotidine due to alternative therapy    Gastroesophageal reflux disease, unspecified whether esophagitis present  - schedule EGD, discussed procedure with patient, including risks and benefits, patient verbalized understanding  -Avoid large meals, avoid eating within 2-3 hours of bedtime (avoid late night eating & lying down soon after eating), elevate head of bed if nocturnal symptoms are present, smoking cessation (if current smoker), & weight loss (if overweight).   -Avoid known foods which trigger reflux symptoms & to minimize/avoid high-fat foods, chocolate, caffeine, citrus, alcohol, & tomato products.  -Avoid/limit use of NSAID's, since they can cause GI upset, bleeding, and/or ulcers. If needed, take with food.  -START: pantoprazole (PROTONIX) 40 MG tablet; Take 1 tablet (40 mg total) by mouth once daily.  Dispense: 90 tablet; Refill: 0  - discontinue famotidine due to alternative therapy    Screening for colon cancer  - surveillance colonoscopy will be determined once records are received    Follow up in about 3 months (around 9/30/2025), or if symptoms worsen or fail to improve.      If no improvement in symptoms or symptoms worsen, call/follow-up at clinic or go to ER.        Total time spent on the encounter includes face to face time and non-face to face time preparing to see the patient (eg, review of tests), Obtaining and/or reviewing separately obtained history, Documenting clinical information in the electronic or other health record, Independently interpreting results (not separately reported) and communicating results to the patient/family/caregiver, or Care coordination (not separately reported).     A dictation software program was used for this note. Please expect some simple typographical  errors in  this note.         [1]   Social History  Tobacco Use    Smoking status: Former     Current packs/day: 0.00     Types: Cigarettes     Quit date: 4/10/2022     Years since quitting: 3.2    Smokeless tobacco: Never   Substance Use Topics    Alcohol use: Not Currently     Alcohol/week: 8.0 standard drinks of alcohol     Types: 2 Glasses of wine, 6 Cans of beer per week    Drug use: Never

## 2025-07-01 ENCOUNTER — RESULTS FOLLOW-UP (OUTPATIENT)
Dept: GASTROENTEROLOGY | Facility: CLINIC | Age: 55
End: 2025-07-01

## 2025-07-09 ENCOUNTER — TREATMENT PLANNING (OUTPATIENT)
Dept: GASTROENTEROLOGY | Facility: CLINIC | Age: 55
End: 2025-07-09
Payer: COMMERCIAL

## 2025-07-09 NOTE — PROGRESS NOTES
Reviewed medical records received from Dr. Conroy, summarized below and in medical & surgical history (endoscopies, etc), copies made & given to nurse to be scanned into system:     Colonoscopy 11/08/2022  Impression:  Tortuous colon.  One 5 mm polyp in the ascending colon, removed with a cold snare.  Resected and retrieved.  One 3 mm polyp in the transverse colon, removed with a cold biopsy forceps.  Resected and retrieved.  The examination was otherwise normal on direct and retroflexion views.    Patho:  A. Colon - Ascending:  Fragments of tubular adenoma(s)  B. Colon - Transverse:  Fragments of tubular adenoma(s)    Recommendations:  Continue with previous recommendations.  Follow-up for surveillance colonoscopy 11/2027.      
03-Feb-2021 13:22

## 2025-07-18 ENCOUNTER — ANESTHESIA (OUTPATIENT)
Dept: ENDOSCOPY | Facility: HOSPITAL | Age: 55
End: 2025-07-18
Payer: COMMERCIAL

## 2025-07-18 ENCOUNTER — PATIENT MESSAGE (OUTPATIENT)
Dept: GASTROENTEROLOGY | Facility: CLINIC | Age: 55
End: 2025-07-18
Payer: COMMERCIAL

## 2025-07-18 ENCOUNTER — ANESTHESIA EVENT (OUTPATIENT)
Dept: ENDOSCOPY | Facility: HOSPITAL | Age: 55
End: 2025-07-18
Payer: COMMERCIAL

## 2025-07-18 ENCOUNTER — HOSPITAL ENCOUNTER (OUTPATIENT)
Facility: HOSPITAL | Age: 55
Discharge: HOME | End: 2025-07-18
Attending: STUDENT IN AN ORGANIZED HEALTH CARE EDUCATION/TRAINING PROGRAM | Admitting: STUDENT IN AN ORGANIZED HEALTH CARE EDUCATION/TRAINING PROGRAM
Payer: COMMERCIAL

## 2025-07-18 VITALS
TEMPERATURE: 98 F | DIASTOLIC BLOOD PRESSURE: 73 MMHG | OXYGEN SATURATION: 95 % | HEART RATE: 66 BPM | SYSTOLIC BLOOD PRESSURE: 112 MMHG | RESPIRATION RATE: 11 BRPM | BODY MASS INDEX: 22.18 KG/M2 | WEIGHT: 138 LBS | HEIGHT: 66 IN

## 2025-07-18 DIAGNOSIS — R10.13 EPIGASTRIC PAIN: ICD-10-CM

## 2025-07-18 DIAGNOSIS — K21.9 GASTROESOPHAGEAL REFLUX DISEASE, UNSPECIFIED WHETHER ESOPHAGITIS PRESENT: ICD-10-CM

## 2025-07-18 PROCEDURE — 63600175 PHARM REV CODE 636 W HCPCS: Mod: PO | Performed by: STUDENT IN AN ORGANIZED HEALTH CARE EDUCATION/TRAINING PROGRAM

## 2025-07-18 PROCEDURE — 43239 EGD BIOPSY SINGLE/MULTIPLE: CPT | Mod: PO | Performed by: STUDENT IN AN ORGANIZED HEALTH CARE EDUCATION/TRAINING PROGRAM

## 2025-07-18 PROCEDURE — 88305 TISSUE EXAM BY PATHOLOGIST: CPT | Mod: TC | Performed by: STUDENT IN AN ORGANIZED HEALTH CARE EDUCATION/TRAINING PROGRAM

## 2025-07-18 PROCEDURE — 63600175 PHARM REV CODE 636 W HCPCS: Mod: PO | Performed by: ANESTHESIOLOGY

## 2025-07-18 PROCEDURE — 37000009 HC ANESTHESIA EA ADD 15 MINS: Mod: PO | Performed by: STUDENT IN AN ORGANIZED HEALTH CARE EDUCATION/TRAINING PROGRAM

## 2025-07-18 PROCEDURE — 27201012 HC FORCEPS, HOT/COLD, DISP: Mod: PO | Performed by: STUDENT IN AN ORGANIZED HEALTH CARE EDUCATION/TRAINING PROGRAM

## 2025-07-18 PROCEDURE — 37000008 HC ANESTHESIA 1ST 15 MINUTES: Mod: PO | Performed by: STUDENT IN AN ORGANIZED HEALTH CARE EDUCATION/TRAINING PROGRAM

## 2025-07-18 PROCEDURE — 43239 EGD BIOPSY SINGLE/MULTIPLE: CPT | Mod: ,,, | Performed by: STUDENT IN AN ORGANIZED HEALTH CARE EDUCATION/TRAINING PROGRAM

## 2025-07-18 RX ORDER — SODIUM CHLORIDE 0.9 % (FLUSH) 0.9 %
10 SYRINGE (ML) INJECTION
Status: DISCONTINUED | OUTPATIENT
Start: 2025-07-18 | End: 2025-07-18 | Stop reason: HOSPADM

## 2025-07-18 RX ORDER — PROPOFOL 10 MG/ML
VIAL (ML) INTRAVENOUS
Status: DISCONTINUED | OUTPATIENT
Start: 2025-07-18 | End: 2025-07-18

## 2025-07-18 RX ORDER — SODIUM CHLORIDE, SODIUM LACTATE, POTASSIUM CHLORIDE, CALCIUM CHLORIDE 600; 310; 30; 20 MG/100ML; MG/100ML; MG/100ML; MG/100ML
INJECTION, SOLUTION INTRAVENOUS CONTINUOUS
Status: DISCONTINUED | OUTPATIENT
Start: 2025-07-18 | End: 2025-07-18 | Stop reason: HOSPADM

## 2025-07-18 RX ORDER — LIDOCAINE HYDROCHLORIDE 20 MG/ML
INJECTION INTRAVENOUS
Status: DISCONTINUED | OUTPATIENT
Start: 2025-07-18 | End: 2025-07-18

## 2025-07-18 RX ADMIN — SODIUM CHLORIDE, POTASSIUM CHLORIDE, SODIUM LACTATE AND CALCIUM CHLORIDE: 600; 310; 30; 20 INJECTION, SOLUTION INTRAVENOUS at 12:07

## 2025-07-18 RX ADMIN — PROPOFOL 50 MG: 10 INJECTION, EMULSION INTRAVENOUS at 01:07

## 2025-07-18 RX ADMIN — PROPOFOL 100 MG: 10 INJECTION, EMULSION INTRAVENOUS at 01:07

## 2025-07-18 RX ADMIN — LIDOCAINE HYDROCHLORIDE 100 MG: 20 INJECTION INTRAVENOUS at 01:07

## 2025-07-18 NOTE — ANESTHESIA PREPROCEDURE EVALUATION
07/18/2025  Adamaris Vanegas is a 55 y.o., female.      Pre-op Assessment    I have reviewed the Patient Summary Reports.     I have reviewed the Nursing Notes. I have reviewed the NPO Status.   I have reviewed the Medications.     Review of Systems  Anesthesia Hx:  No problems with previous Anesthesia                Social:  Former Smoker Quit 4/2022      Hematology/Oncology:  Hematology Normal   Oncology Normal                                   EENT/Dental:  EENT/Dental Normal           Cardiovascular:  Cardiovascular Normal                                              Pulmonary:  Pulmonary Normal                       Renal/:  Renal/ Normal                 Hepatic/GI:  Hepatic/GI Normal                    Musculoskeletal:     Primary osteoarthritis of right hand - joint fusion this admit            Neurological:  Neurology Normal                                      Endocrine:  Endocrine Normal            Dermatological:  Skin Normal    Psych:   anxiety                 Physical Exam  General: Well nourished and Cooperative    Airway:  Mouth Opening: Normal  TM Distance: Normal  Tongue: Normal  Neck ROM: Normal ROM    Dental:  Intact    Chest/Lungs:  Clear to auscultation, Normal Respiratory Rate    Heart:  Rate: Normal  Rhythm: Regular Rhythm        Anesthesia Plan  Type of Anesthesia, risks & benefits discussed:    Anesthesia Type: Gen Natural Airway  Intra-op Monitoring Plan: Standard ASA Monitors  Post Op Pain Control Plan: multimodal analgesia  Induction:  IV  Airway Plan: Direct, Post-Induction  Informed Consent: Informed consent signed with the Patient and all parties understand the risks and agree with anesthesia plan.  All questions answered.   ASA Score: 2    Ready For Surgery From Anesthesia Perspective.     .

## 2025-07-18 NOTE — TRANSFER OF CARE
"Anesthesia Transfer of Care Note    Patient: Adamaris Vanegas    Procedure(s) Performed: Procedure(s) (LRB):  EGD (ESOPHAGOGASTRODUODENOSCOPY) (N/A)    Patient location: PACU    Anesthesia Type: general    Transport from OR: Transported from OR on room air with adequate spontaneous ventilation    Post pain: adequate analgesia    Post assessment: no apparent anesthetic complications and tolerated procedure well    Post vital signs: stable    Level of consciousness: awake    Nausea/Vomiting: no nausea/vomiting    Complications: none    Transfer of care protocol was followed      Last vitals: Visit Vitals  /64 (BP Location: Right arm, Patient Position: Lying)   Pulse 69   Temp 36.3 °C (97.3 °F) (Skin)   Resp 15   Ht 5' 6" (1.676 m)   Wt 62.6 kg (138 lb)   LMP 04/02/2018   SpO2 95%   Breastfeeding No   BMI 22.27 kg/m²     "

## 2025-07-18 NOTE — PROVATION PATIENT INSTRUCTIONS
Discharge Summary/Instructions after an Endoscopic Procedure  Patient Name: Adamaris Vanegas  Patient MRN: [id number]  Patient YOB: 1970  Friday, July 18, 2025  Wilfredo Garvin DO  Dear patient,  As a result of recent federal legislation (The Federal Cures Act), you may   receive lab or pathology results from your procedure in your [facility]   account before your physician is able to contact you. Your physician or   their representative will relay the results to you with their   recommendations at their soonest availability.  Thank you,  RESTRICTIONS:  During your procedure today, you received medications for sedation.  These   medications may affect your judgment, balance and coordination.  Therefore,   for 24 hours, you have the following restrictions:   - DO NOT drive a car, operate machinery, make legal/financial decisions,   sign important papers or drink alcohol.    ACTIVITY:  Today: no heavy lifting, straining or running due to procedural   sedation/anesthesia.  The following day: return to full activity including work.  DIET:  Eat and drink normally unless instructed otherwise.     TREATMENT FOR COMMON SIDE EFFECTS:  - Mild abdominal pain, nausea, belching, bloating or excessive gas:  rest,   eat lightly and use a heating pad.  - Sore Throat: treat with throat lozenges and/or gargle with warm salt   water.  - Because air was used during the procedure, expelling large amounts of air   from your rectum or belching is normal.  - If a bowel prep was taken, you may not have a bowel movement for 1-3 days.    This is normal.  SYMPTOMS TO WATCH FOR AND REPORT TO YOUR PHYSICIAN:  1. Abdominal pain or bloating, other than gas cramps.  2. Chest pain.  3. Back pain.  4. Signs of infection such as: chills or fever occurring within 24 hours   after the procedure.  5. Rectal bleeding, which would show as bright red, maroon, or black stools.   (A tablespoon of blood from the rectum is not serious, especially if    hemorrhoids are present.)  6. Vomiting.  7. Weakness or dizziness.  GO DIRECTLY TO THE NEAREST EMERGENCY ROOM IF YOU HAVE ANY OF THE FOLLOWING:      Difficulty breathing              Chills and/or fever over 101 F   Persistent vomiting and/or vomiting blood   Severe abdominal pain   Severe chest pain   Black, tarry stools   Bleeding- more than one tablespoon   Any other symptom or condition that you feel may need urgent attention  Your doctor recommends these additional instructions:  If any biopsies were taken, your doctors clinic will contact you in 1 to 2   weeks with any results.  You have a contact number available for emergencies.  The signs and symptoms   of potential delayed complications were discussed with you.  You may return   to normal activities tomorrow.  Written discharge instructions were   provided to you.   Eat a gastroesophageal reflux disease (GERD) prevention diet.   Continue your present medications.   We are waiting for your pathology results.   Return to your nurse practitioner as previously scheduled.   You are being discharged to home.  For questions, problems or results please call your physician - Wilfredo Garvin DO at Work:  [phone].  EMERGENCY PHONE NUMBER: [phone], LAB RESULTS: [phone]  IF A COMPLICATION OR EMERGENCY SITUATION ARISES AND YOU ARE UNABLE TO REACH   YOUR PHYSICIAN - GO DIRECTLY TO THE EMERGENCY ROOM.  ___________________________________________  Nurse Signature  ___________________________________________  Patient/Designated Responsible Party Signature  Wilfredo Garvin DO  7/18/2025 1:17:04 PM  This report has been verified and signed electronically.  Dear patient,  As a result of recent federal legislation (The Federal Cures Act), you may   receive lab or pathology results from your procedure in your [facility]   account before your physician is able to contact you. Your physician or   their representative will relay the results to you with  their   recommendations at their soonest availability.  Thank you.  PROVATION

## 2025-07-18 NOTE — H&P
History & Physical - Short Stay  Gastroenterology      SUBJECTIVE:     Procedure: EGD    Chief Complaint/Indication for Procedure: Epigastric Pain    PTA Medications   Medication Sig    DULoxetine (CYMBALTA) 30 MG capsule Take 30 mg by mouth.    ferrous sulfate (FEOSOL) Tab tablet Take 1 tablet by mouth daily with breakfast.    lysine 1,000 mg Tab Take by mouth.    multivitamin capsule Take 1 capsule by mouth once daily.    pantoprazole (PROTONIX) 40 MG tablet Take 1 tablet (40 mg total) by mouth once daily.    progesterone (PROMETRIUM) 200 MG capsule Take 200 mg by mouth.    valACYclovir (VALTREX) 1000 MG tablet Take 1 tablet (1,000 mg total) by mouth once daily.    cholecalciferol, vitamin D3, 125 mcg (5,000 unit) Tab Take 5,000 Units by mouth once daily.    famotidine (PEPCID) 40 MG tablet Take 1 tablet (40 mg total) by mouth 2 (two) times daily as needed for Heartburn.    loratadine (CLARITIN) 10 mg tablet Take 10 mg by mouth once daily. (Patient not taking: Reported on 11/26/2024)    OXIATAR 0.025-0.5-4 % Crea Apply 1 application  topically every evening.       Review of patient's allergies indicates:  No Known Allergies     Past Medical History:   Diagnosis Date    Anxiety     GERD (gastroesophageal reflux disease)      Past Surgical History:   Procedure Laterality Date    COLONOSCOPY      1-2 years ago - normal per pt report    HAND FUSION Right 04/14/2022    Procedure: FUSION, JOINT, HAND -  Litlle Distal;  Surgeon: Evaristo Andino MD;  Location: [facility];  Service: Orthopedics;  Laterality: Right;    WISDOM TOOTH EXTRACTION       Family History   Problem Relation Name Age of Onset    COPD Father       Social History[1]      OBJECTIVE:     Vital Signs (Most Recent)       Physical Exam:                                                       GENERAL:  Comfortable, in no acute distress.                                 HEENT EXAM:  Nonicteric.  No adenopathy.  Oropharynx is clear.               NECK:  Supple.                                                                LUNGS:  Clear.                                                               CARDIAC:  Regular rate and rhythm.  S1, S2.  No murmur.                      ABDOMEN:  Soft, positive bowel sounds, nontender.  No hepatosplenomegaly or masses.  No rebound or guarding.                                             EXTREMITIES:  No edema.     MENTAL STATUS:  Normal, alert and oriented.      ASSESSMENT/PLAN:     Assessment: Epigastric Pain    Plan: EGD    Anesthesia Plan: General    ASA Grade: ASA 2 - Patient with mild systemic disease with no functional limitations    MALLAMPATI SCORE:  I (soft palate, uvula, fauces, and tonsillar pillars visible)           [1]   Social History  Tobacco Use    Smoking status: Former     Current packs/day: 0.00     Types: Cigarettes     Quit date: 4/10/2022     Years since quitting: 3.2    Smokeless tobacco: Never   Substance Use Topics    Alcohol use: Yes     Alcohol/week: 5.0 standard drinks of alcohol     Types: 2 Glasses of wine, 3 Cans of beer per week    Drug use: Never

## 2025-07-18 NOTE — ANESTHESIA POSTPROCEDURE EVALUATION
Anesthesia Post Evaluation    Patient: Adamaris Vanegas    Procedure(s) Performed: Procedure(s) (LRB):  EGD (ESOPHAGOGASTRODUODENOSCOPY) (N/A)    Final Anesthesia Type: general      Patient location during evaluation: PACU  Patient participation: Yes- Able to Participate  Level of consciousness: awake and alert and oriented  Post-procedure vital signs: reviewed and stable  Pain management: adequate  Airway patency: patent    PONV status at discharge: No PONV  Anesthetic complications: no      Cardiovascular status: blood pressure returned to baseline  Respiratory status: unassisted, spontaneous ventilation and room air  Hydration status: euvolemic  Follow-up not needed.              Vitals Value Taken Time   BP 96/52 07/18/25 13:18   Temp 36.5 °C (97.7 °F) 07/18/25 13:18   Pulse 74 07/18/25 13:18   Resp 14 07/18/25 13:18   SpO2 99 % 07/18/25 13:18         Event Time   Out of Recovery 13:21:00         Pain/Goldie Score: Goldie Score: 9 (7/18/2025  1:19 PM)

## 2025-07-21 LAB
ESTROGEN SERPL-MCNC: NORMAL PG/ML
INSULIN SERPL-ACNC: NORMAL U[IU]/ML
LAB AP CLINICAL INFORMATION: NORMAL
LAB AP GROSS DESCRIPTION: NORMAL
LAB AP PERFORMING LOCATION(S): NORMAL
LAB AP REPORT FOOTNOTES: NORMAL

## 2025-07-21 NOTE — TELEPHONE ENCOUNTER
Please let the patient know after reviewing her EGD Dr. Garvin recommended her continue present recommendations including pantoprazole 40 mg once daily and implement a GERD prevention diet.  Let her know she can follow-up in about 3 months to discuss decreasing/discontinue Protonix if asymptomatic.  Sincerely,  Julia Berry NP

## 2025-08-08 ENCOUNTER — RESULTS FOLLOW-UP (OUTPATIENT)
Dept: GASTROENTEROLOGY | Facility: CLINIC | Age: 55
End: 2025-08-08
Payer: COMMERCIAL

## 2025-09-05 DIAGNOSIS — K21.9 GASTROESOPHAGEAL REFLUX DISEASE, UNSPECIFIED WHETHER ESOPHAGITIS PRESENT: ICD-10-CM

## 2025-09-05 DIAGNOSIS — R10.13 EPIGASTRIC PAIN: ICD-10-CM

## 2025-09-05 RX ORDER — PANTOPRAZOLE SODIUM 40 MG/1
40 TABLET, DELAYED RELEASE ORAL
Qty: 90 TABLET | Refills: 2 | Status: SHIPPED | OUTPATIENT
Start: 2025-09-05